# Patient Record
Sex: MALE | Race: WHITE | NOT HISPANIC OR LATINO | Employment: UNEMPLOYED | URBAN - METROPOLITAN AREA
[De-identification: names, ages, dates, MRNs, and addresses within clinical notes are randomized per-mention and may not be internally consistent; named-entity substitution may affect disease eponyms.]

---

## 2017-02-10 ENCOUNTER — COMMUNICATION - HEALTHEAST (OUTPATIENT)
Dept: FAMILY MEDICINE | Facility: CLINIC | Age: 42
End: 2017-02-10

## 2017-02-10 DIAGNOSIS — F41.9 ANXIETY: ICD-10-CM

## 2017-02-14 ENCOUNTER — COMMUNICATION - HEALTHEAST (OUTPATIENT)
Dept: FAMILY MEDICINE | Facility: CLINIC | Age: 42
End: 2017-02-14

## 2017-02-14 DIAGNOSIS — F41.9 ANXIETY: ICD-10-CM

## 2017-03-12 ENCOUNTER — AMBULATORY - HEALTHEAST (OUTPATIENT)
Dept: FAMILY MEDICINE | Facility: CLINIC | Age: 42
End: 2017-03-12

## 2017-03-12 DIAGNOSIS — Z13.21 SCREENING FOR ENDOCRINE, NUTRITIONAL, METABOLIC AND IMMUNITY DISORDER: ICD-10-CM

## 2017-03-12 DIAGNOSIS — Z13.228 SCREENING FOR ENDOCRINE, NUTRITIONAL, METABOLIC AND IMMUNITY DISORDER: ICD-10-CM

## 2017-03-12 DIAGNOSIS — Z13.29 SCREENING FOR ENDOCRINE, NUTRITIONAL, METABOLIC AND IMMUNITY DISORDER: ICD-10-CM

## 2017-03-12 DIAGNOSIS — E55.9 VITAMIN D DEFICIENCY: ICD-10-CM

## 2017-03-12 DIAGNOSIS — Z13.0 SCREENING FOR ENDOCRINE, NUTRITIONAL, METABOLIC AND IMMUNITY DISORDER: ICD-10-CM

## 2017-03-12 DIAGNOSIS — Z00.00 VISIT FOR PREVENTIVE HEALTH EXAMINATION: ICD-10-CM

## 2017-03-14 ENCOUNTER — OFFICE VISIT - HEALTHEAST (OUTPATIENT)
Dept: FAMILY MEDICINE | Facility: CLINIC | Age: 42
End: 2017-03-14

## 2017-03-14 DIAGNOSIS — Z00.00 VISIT FOR PREVENTIVE HEALTH EXAMINATION: ICD-10-CM

## 2017-03-14 DIAGNOSIS — Z13.0 SCREENING FOR ENDOCRINE, NUTRITIONAL, METABOLIC AND IMMUNITY DISORDER: ICD-10-CM

## 2017-03-14 DIAGNOSIS — Z13.228 SCREENING FOR ENDOCRINE, NUTRITIONAL, METABOLIC AND IMMUNITY DISORDER: ICD-10-CM

## 2017-03-14 DIAGNOSIS — Z13.21 SCREENING FOR ENDOCRINE, NUTRITIONAL, METABOLIC AND IMMUNITY DISORDER: ICD-10-CM

## 2017-03-14 DIAGNOSIS — Z13.29 SCREENING FOR ENDOCRINE, NUTRITIONAL, METABOLIC AND IMMUNITY DISORDER: ICD-10-CM

## 2017-03-14 DIAGNOSIS — F41.9 ANXIETY: ICD-10-CM

## 2017-03-14 DIAGNOSIS — E55.9 VITAMIN D DEFICIENCY: ICD-10-CM

## 2017-03-14 LAB
CHOLEST SERPL-MCNC: 161 MG/DL
FASTING STATUS PATIENT QL REPORTED: NORMAL
HDLC SERPL-MCNC: 66 MG/DL
LDLC SERPL CALC-MCNC: 83 MG/DL
TRIGL SERPL-MCNC: 59 MG/DL

## 2017-03-14 ASSESSMENT — MIFFLIN-ST. JEOR: SCORE: 1753.09

## 2017-03-16 ENCOUNTER — COMMUNICATION - HEALTHEAST (OUTPATIENT)
Dept: FAMILY MEDICINE | Facility: CLINIC | Age: 42
End: 2017-03-16

## 2017-03-16 DIAGNOSIS — F41.9 ANXIETY: ICD-10-CM

## 2017-03-18 ENCOUNTER — AMBULATORY - HEALTHEAST (OUTPATIENT)
Dept: FAMILY MEDICINE | Facility: CLINIC | Age: 42
End: 2017-03-18

## 2017-03-18 DIAGNOSIS — E55.9 VITAMIN D DEFICIENCY: ICD-10-CM

## 2017-07-29 ENCOUNTER — COMMUNICATION - HEALTHEAST (OUTPATIENT)
Dept: FAMILY MEDICINE | Facility: CLINIC | Age: 42
End: 2017-07-29

## 2017-07-29 DIAGNOSIS — F41.9 ANXIETY: ICD-10-CM

## 2017-08-24 ENCOUNTER — COMMUNICATION - HEALTHEAST (OUTPATIENT)
Dept: FAMILY MEDICINE | Facility: CLINIC | Age: 42
End: 2017-08-24

## 2017-08-24 DIAGNOSIS — G47.00 INSOMNIA: ICD-10-CM

## 2017-08-26 ENCOUNTER — COMMUNICATION - HEALTHEAST (OUTPATIENT)
Dept: FAMILY MEDICINE | Facility: CLINIC | Age: 42
End: 2017-08-26

## 2017-08-26 DIAGNOSIS — E55.9 VITAMIN D DEFICIENCY: ICD-10-CM

## 2017-11-16 ENCOUNTER — COMMUNICATION - HEALTHEAST (OUTPATIENT)
Dept: FAMILY MEDICINE | Facility: CLINIC | Age: 42
End: 2017-11-16

## 2017-11-16 DIAGNOSIS — G47.00 INSOMNIA: ICD-10-CM

## 2017-12-03 ENCOUNTER — COMMUNICATION - HEALTHEAST (OUTPATIENT)
Dept: FAMILY MEDICINE | Facility: CLINIC | Age: 42
End: 2017-12-03

## 2017-12-03 DIAGNOSIS — F32.A DEPRESSION: ICD-10-CM

## 2018-02-13 ENCOUNTER — COMMUNICATION - HEALTHEAST (OUTPATIENT)
Dept: FAMILY MEDICINE | Facility: CLINIC | Age: 43
End: 2018-02-13

## 2018-02-13 DIAGNOSIS — E55.9 VITAMIN D DEFICIENCY: ICD-10-CM

## 2018-03-20 ENCOUNTER — COMMUNICATION - HEALTHEAST (OUTPATIENT)
Dept: FAMILY MEDICINE | Facility: CLINIC | Age: 43
End: 2018-03-20

## 2018-03-20 DIAGNOSIS — F32.A DEPRESSION: ICD-10-CM

## 2018-06-17 ENCOUNTER — COMMUNICATION - HEALTHEAST (OUTPATIENT)
Dept: FAMILY MEDICINE | Facility: CLINIC | Age: 43
End: 2018-06-17

## 2018-06-17 DIAGNOSIS — F32.A DEPRESSION: ICD-10-CM

## 2018-07-05 ENCOUNTER — OFFICE VISIT - HEALTHEAST (OUTPATIENT)
Dept: FAMILY MEDICINE | Facility: CLINIC | Age: 43
End: 2018-07-05

## 2018-07-05 DIAGNOSIS — Z13.21 SCREENING FOR ENDOCRINE, NUTRITIONAL, METABOLIC AND IMMUNITY DISORDER: ICD-10-CM

## 2018-07-05 DIAGNOSIS — Z13.29 SCREENING FOR ENDOCRINE, NUTRITIONAL, METABOLIC AND IMMUNITY DISORDER: ICD-10-CM

## 2018-07-05 DIAGNOSIS — Z13.228 SCREENING FOR ENDOCRINE, NUTRITIONAL, METABOLIC AND IMMUNITY DISORDER: ICD-10-CM

## 2018-07-05 DIAGNOSIS — Z00.00 VISIT FOR PREVENTIVE HEALTH EXAMINATION: ICD-10-CM

## 2018-07-05 DIAGNOSIS — Z13.0 SCREENING FOR ENDOCRINE, NUTRITIONAL, METABOLIC AND IMMUNITY DISORDER: ICD-10-CM

## 2018-07-05 DIAGNOSIS — E55.9 VITAMIN D DEFICIENCY: ICD-10-CM

## 2018-07-05 LAB
ALBUMIN SERPL-MCNC: 4 G/DL (ref 3.5–5)
ALP SERPL-CCNC: 142 U/L (ref 45–120)
ALT SERPL W P-5'-P-CCNC: 48 U/L (ref 0–45)
ANION GAP SERPL CALCULATED.3IONS-SCNC: 8 MMOL/L (ref 5–18)
AST SERPL W P-5'-P-CCNC: 39 U/L (ref 0–40)
BILIRUB SERPL-MCNC: 0.5 MG/DL (ref 0–1)
BUN SERPL-MCNC: 21 MG/DL (ref 8–22)
CALCIUM SERPL-MCNC: 9.7 MG/DL (ref 8.5–10.5)
CHLORIDE BLD-SCNC: 102 MMOL/L (ref 98–107)
CHOLEST SERPL-MCNC: 156 MG/DL
CO2 SERPL-SCNC: 30 MMOL/L (ref 22–31)
CREAT SERPL-MCNC: 1.12 MG/DL (ref 0.7–1.3)
FASTING STATUS PATIENT QL REPORTED: YES
GFR SERPL CREATININE-BSD FRML MDRD: >60 ML/MIN/1.73M2
GLUCOSE BLD-MCNC: 94 MG/DL (ref 70–125)
HDLC SERPL-MCNC: 64 MG/DL
LDLC SERPL CALC-MCNC: 80 MG/DL
POTASSIUM BLD-SCNC: 4.9 MMOL/L (ref 3.5–5)
PROT SERPL-MCNC: 6.5 G/DL (ref 6–8)
SODIUM SERPL-SCNC: 140 MMOL/L (ref 136–145)
TRIGL SERPL-MCNC: 60 MG/DL
TSH SERPL DL<=0.005 MIU/L-ACNC: 3.1 UIU/ML (ref 0.3–5)

## 2018-07-05 ASSESSMENT — MIFFLIN-ST. JEOR: SCORE: 1759.78

## 2018-07-06 LAB — 25(OH)D3 SERPL-MCNC: 91.8 NG/ML (ref 30–80)

## 2018-07-09 ENCOUNTER — COMMUNICATION - HEALTHEAST (OUTPATIENT)
Dept: FAMILY MEDICINE | Facility: CLINIC | Age: 43
End: 2018-07-09

## 2018-11-26 ENCOUNTER — COMMUNICATION - HEALTHEAST (OUTPATIENT)
Dept: FAMILY MEDICINE | Facility: CLINIC | Age: 43
End: 2018-11-26

## 2018-11-26 DIAGNOSIS — G47.00 INSOMNIA: ICD-10-CM

## 2018-12-12 ENCOUNTER — OFFICE VISIT - HEALTHEAST (OUTPATIENT)
Dept: FAMILY MEDICINE | Facility: CLINIC | Age: 43
End: 2018-12-12

## 2018-12-12 DIAGNOSIS — R25.3 MUSCLE TWITCH: ICD-10-CM

## 2018-12-12 ASSESSMENT — MIFFLIN-ST. JEOR: SCORE: 1733.7

## 2019-06-26 ENCOUNTER — OFFICE VISIT - HEALTHEAST (OUTPATIENT)
Dept: FAMILY MEDICINE | Facility: CLINIC | Age: 44
End: 2019-06-26

## 2019-06-26 DIAGNOSIS — R35.0 URINARY FREQUENCY: ICD-10-CM

## 2019-06-26 LAB
ALBUMIN SERPL-MCNC: 3.7 G/DL (ref 3.5–5)
ALBUMIN UR-MCNC: NEGATIVE MG/DL
ALP SERPL-CCNC: 133 U/L (ref 45–120)
ALT SERPL W P-5'-P-CCNC: 23 U/L (ref 0–45)
ANION GAP SERPL CALCULATED.3IONS-SCNC: 8 MMOL/L (ref 5–18)
APPEARANCE UR: CLEAR
AST SERPL W P-5'-P-CCNC: 27 U/L (ref 0–40)
BACTERIA #/AREA URNS HPF: ABNORMAL HPF
BILIRUB SERPL-MCNC: 0.3 MG/DL (ref 0–1)
BILIRUB UR QL STRIP: NEGATIVE
BUN SERPL-MCNC: 25 MG/DL (ref 8–22)
CALCIUM SERPL-MCNC: 9.5 MG/DL (ref 8.5–10.5)
CHLORIDE BLD-SCNC: 106 MMOL/L (ref 98–107)
CO2 SERPL-SCNC: 27 MMOL/L (ref 22–31)
COLOR UR AUTO: YELLOW
CREAT SERPL-MCNC: 1.02 MG/DL (ref 0.7–1.3)
GFR SERPL CREATININE-BSD FRML MDRD: >60 ML/MIN/1.73M2
GLUCOSE BLD-MCNC: 72 MG/DL (ref 70–125)
GLUCOSE UR STRIP-MCNC: NEGATIVE MG/DL
HGB UR QL STRIP: NEGATIVE
KETONES UR STRIP-MCNC: NEGATIVE MG/DL
LEUKOCYTE ESTERASE UR QL STRIP: ABNORMAL
NITRATE UR QL: NEGATIVE
OSMOLALITY SERPL: 300 MOSM/KG (ref 270–300)
OSMOLALITY UR: 653 MOSM/KG (ref 300–900)
PH UR STRIP: 5 [PH] (ref 5–8)
POTASSIUM BLD-SCNC: 4.3 MMOL/L (ref 3.5–5)
PROT SERPL-MCNC: 6 G/DL (ref 6–8)
PSA SERPL-MCNC: 1.8 NG/ML (ref 0–2.5)
RBC #/AREA URNS AUTO: ABNORMAL HPF
SODIUM SERPL-SCNC: 141 MMOL/L (ref 136–145)
SODIUM UR-SCNC: 113 MMOL/L
SP GR UR STRIP: 1.02 (ref 1–1.03)
SQUAMOUS #/AREA URNS AUTO: ABNORMAL LPF
TSH SERPL DL<=0.005 MIU/L-ACNC: 2.06 UIU/ML (ref 0.3–5)
UROBILINOGEN UR STRIP-ACNC: ABNORMAL
WBC #/AREA URNS AUTO: ABNORMAL HPF

## 2019-06-26 ASSESSMENT — MIFFLIN-ST. JEOR: SCORE: 1839.16

## 2019-06-27 LAB — BACTERIA SPEC CULT: NO GROWTH

## 2019-09-18 ENCOUNTER — OFFICE VISIT - HEALTHEAST (OUTPATIENT)
Dept: FAMILY MEDICINE | Facility: CLINIC | Age: 44
End: 2019-09-18

## 2019-09-18 DIAGNOSIS — Z13.228 SCREENING FOR ENDOCRINE, NUTRITIONAL, METABOLIC AND IMMUNITY DISORDER: ICD-10-CM

## 2019-09-18 DIAGNOSIS — Z13.29 SCREENING FOR ENDOCRINE, NUTRITIONAL, METABOLIC AND IMMUNITY DISORDER: ICD-10-CM

## 2019-09-18 DIAGNOSIS — Z13.0 SCREENING FOR ENDOCRINE, NUTRITIONAL, METABOLIC AND IMMUNITY DISORDER: ICD-10-CM

## 2019-09-18 DIAGNOSIS — Z00.00 VISIT FOR PREVENTIVE HEALTH EXAMINATION: ICD-10-CM

## 2019-09-18 DIAGNOSIS — Z13.21 SCREENING FOR ENDOCRINE, NUTRITIONAL, METABOLIC AND IMMUNITY DISORDER: ICD-10-CM

## 2019-09-18 DIAGNOSIS — F41.1 ANXIETY STATE: ICD-10-CM

## 2019-09-18 DIAGNOSIS — Z11.4 SCREENING FOR HIV WITHOUT PRESENCE OF RISK FACTORS: ICD-10-CM

## 2019-09-18 DIAGNOSIS — R35.0 INCREASED FREQUENCY OF URINATION: ICD-10-CM

## 2019-09-18 LAB
ALBUMIN SERPL-MCNC: 3.8 G/DL (ref 3.5–5)
ALP SERPL-CCNC: 123 U/L (ref 45–120)
ALT SERPL W P-5'-P-CCNC: 26 U/L (ref 0–45)
ANION GAP SERPL CALCULATED.3IONS-SCNC: 9 MMOL/L (ref 5–18)
AST SERPL W P-5'-P-CCNC: 34 U/L (ref 0–40)
BILIRUB SERPL-MCNC: 0.8 MG/DL (ref 0–1)
BUN SERPL-MCNC: 21 MG/DL (ref 8–22)
CALCIUM SERPL-MCNC: 9.5 MG/DL (ref 8.5–10.5)
CHLORIDE BLD-SCNC: 105 MMOL/L (ref 98–107)
CHOLEST SERPL-MCNC: 175 MG/DL
CO2 SERPL-SCNC: 25 MMOL/L (ref 22–31)
CREAT SERPL-MCNC: 1.1 MG/DL (ref 0.7–1.3)
FASTING STATUS PATIENT QL REPORTED: YES
GFR SERPL CREATININE-BSD FRML MDRD: >60 ML/MIN/1.73M2
GLUCOSE BLD-MCNC: 89 MG/DL (ref 70–125)
HDLC SERPL-MCNC: 68 MG/DL
HIV 1+2 AB+HIV1 P24 AG SERPL QL IA: NEGATIVE
LDLC SERPL CALC-MCNC: 98 MG/DL
POTASSIUM BLD-SCNC: 4.3 MMOL/L (ref 3.5–5)
PROT SERPL-MCNC: 6.4 G/DL (ref 6–8)
SODIUM SERPL-SCNC: 139 MMOL/L (ref 136–145)
TRIGL SERPL-MCNC: 43 MG/DL

## 2019-09-18 RX ORDER — CHLORAL HYDRATE 500 MG
2 CAPSULE ORAL DAILY
Status: SHIPPED | COMMUNITY
Start: 2019-09-18

## 2019-09-18 ASSESSMENT — MIFFLIN-ST. JEOR: SCORE: 1837.46

## 2019-10-02 ENCOUNTER — RECORDS - HEALTHEAST (OUTPATIENT)
Dept: ADMINISTRATIVE | Facility: OTHER | Age: 44
End: 2019-10-02

## 2019-10-09 ENCOUNTER — COMMUNICATION - HEALTHEAST (OUTPATIENT)
Dept: FAMILY MEDICINE | Facility: CLINIC | Age: 44
End: 2019-10-09

## 2019-10-09 DIAGNOSIS — E55.9 VITAMIN D DEFICIENCY: ICD-10-CM

## 2019-10-09 RX ORDER — ERGOCALCIFEROL 1.25 MG/1
50000 CAPSULE ORAL WEEKLY
Qty: 8 CAPSULE | Refills: 0 | Status: SHIPPED | OUTPATIENT
Start: 2019-10-09

## 2019-12-04 ENCOUNTER — RECORDS - HEALTHEAST (OUTPATIENT)
Dept: ADMINISTRATIVE | Facility: OTHER | Age: 44
End: 2019-12-04

## 2020-09-02 ENCOUNTER — OFFICE VISIT - HEALTHEAST (OUTPATIENT)
Dept: FAMILY MEDICINE | Facility: CLINIC | Age: 45
End: 2020-09-02

## 2020-09-02 DIAGNOSIS — R22.31 MASS OF ARM, RIGHT: ICD-10-CM

## 2020-09-02 DIAGNOSIS — F43.10 PTSD (POST-TRAUMATIC STRESS DISORDER): ICD-10-CM

## 2020-09-02 DIAGNOSIS — G43.109 MIGRAINE WITH AURA AND WITHOUT STATUS MIGRAINOSUS, NOT INTRACTABLE: ICD-10-CM

## 2020-09-02 DIAGNOSIS — F41.1 ANXIETY STATE: ICD-10-CM

## 2020-09-02 RX ORDER — SUMATRIPTAN 50 MG/1
50 TABLET, FILM COATED ORAL
Qty: 30 TABLET | Refills: 2 | Status: SHIPPED | OUTPATIENT
Start: 2020-09-02 | End: 2021-07-21

## 2020-09-09 ENCOUNTER — RECORDS - HEALTHEAST (OUTPATIENT)
Dept: ADMINISTRATIVE | Facility: OTHER | Age: 45
End: 2020-09-09

## 2020-09-10 ENCOUNTER — COMMUNICATION - HEALTHEAST (OUTPATIENT)
Dept: FAMILY MEDICINE | Facility: CLINIC | Age: 45
End: 2020-09-10

## 2020-09-10 ENCOUNTER — HOSPITAL ENCOUNTER (OUTPATIENT)
Dept: ULTRASOUND IMAGING | Facility: CLINIC | Age: 45
Discharge: HOME | End: 2020-09-10
Attending: FAMILY MEDICINE

## 2020-09-10 DIAGNOSIS — R22.31 MASS OF ARM, RIGHT: ICD-10-CM

## 2020-09-18 ENCOUNTER — COMMUNICATION - HEALTHEAST (OUTPATIENT)
Dept: FAMILY MEDICINE | Facility: CLINIC | Age: 45
End: 2020-09-18

## 2020-09-18 DIAGNOSIS — M62.89 PELVIC FLOOR DYSFUNCTION: ICD-10-CM

## 2020-09-18 DIAGNOSIS — N50.82 SCROTUM PAIN: ICD-10-CM

## 2020-09-19 ENCOUNTER — COMMUNICATION - HEALTHEAST (OUTPATIENT)
Dept: FAMILY MEDICINE | Facility: CLINIC | Age: 45
End: 2020-09-19

## 2020-09-19 DIAGNOSIS — F43.10 PTSD (POST-TRAUMATIC STRESS DISORDER): ICD-10-CM

## 2020-09-25 ENCOUNTER — HOSPITAL ENCOUNTER (OUTPATIENT)
Dept: ULTRASOUND IMAGING | Facility: CLINIC | Age: 45
Discharge: HOME | End: 2020-09-25
Attending: FAMILY MEDICINE

## 2020-09-25 ENCOUNTER — AMBULATORY - HEALTHEAST (OUTPATIENT)
Dept: FAMILY MEDICINE | Facility: CLINIC | Age: 45
End: 2020-09-25

## 2020-09-25 ENCOUNTER — COMMUNICATION - HEALTHEAST (OUTPATIENT)
Dept: FAMILY MEDICINE | Facility: CLINIC | Age: 45
End: 2020-09-25

## 2020-09-25 DIAGNOSIS — R35.0 FREQUENT URINATION: ICD-10-CM

## 2020-09-25 DIAGNOSIS — N50.82 SCROTUM PAIN: ICD-10-CM

## 2020-09-25 DIAGNOSIS — M62.89 PELVIC FLOOR DYSFUNCTION: ICD-10-CM

## 2020-10-10 ENCOUNTER — COMMUNICATION - HEALTHEAST (OUTPATIENT)
Dept: FAMILY MEDICINE | Facility: CLINIC | Age: 45
End: 2020-10-10

## 2020-10-10 DIAGNOSIS — F43.10 PTSD (POST-TRAUMATIC STRESS DISORDER): ICD-10-CM

## 2020-10-10 RX ORDER — PRAZOSIN HYDROCHLORIDE 2 MG/1
4 CAPSULE ORAL AT BEDTIME
Qty: 180 CAPSULE | Refills: 3 | Status: SHIPPED | OUTPATIENT
Start: 2020-10-10 | End: 2021-07-21

## 2020-10-12 ENCOUNTER — THERAPY VISIT (OUTPATIENT)
Dept: PHYSICAL THERAPY | Facility: CLINIC | Age: 45
End: 2020-10-12
Payer: COMMERCIAL

## 2020-10-12 DIAGNOSIS — R27.8 MUSCULAR INCOORDINATION: ICD-10-CM

## 2020-10-12 DIAGNOSIS — M62.89 PELVIC FLOOR DYSFUNCTION: ICD-10-CM

## 2020-10-12 DIAGNOSIS — R10.2 PELVIC PAIN IN MALE: ICD-10-CM

## 2020-10-12 DIAGNOSIS — R35.0 URINARY FREQUENCY: ICD-10-CM

## 2020-10-12 PROCEDURE — 97535 SELF CARE MNGMENT TRAINING: CPT | Mod: GP | Performed by: PHYSICAL THERAPIST

## 2020-10-12 PROCEDURE — 97110 THERAPEUTIC EXERCISES: CPT | Mod: GP | Performed by: PHYSICAL THERAPIST

## 2020-10-12 PROCEDURE — 97162 PT EVAL MOD COMPLEX 30 MIN: CPT | Mod: GP | Performed by: PHYSICAL THERAPIST
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
## 2020-10-15 ENCOUNTER — THERAPY VISIT (OUTPATIENT)
Dept: PHYSICAL THERAPY | Facility: CLINIC | Age: 45
End: 2020-10-15
Payer: COMMERCIAL

## 2020-10-15 DIAGNOSIS — R35.0 URINARY FREQUENCY: ICD-10-CM

## 2020-10-15 DIAGNOSIS — M62.89 HIGH-TONE PELVIC FLOOR DYSFUNCTION: ICD-10-CM

## 2020-10-15 DIAGNOSIS — R27.8 MUSCULAR INCOORDINATION: ICD-10-CM

## 2020-10-15 DIAGNOSIS — R10.2 PELVIC PAIN IN MALE: ICD-10-CM

## 2020-10-15 PROCEDURE — 97110 THERAPEUTIC EXERCISES: CPT | Mod: GP | Performed by: PHYSICAL THERAPIST

## 2020-10-15 PROCEDURE — 97112 NEUROMUSCULAR REEDUCATION: CPT | Mod: GP | Performed by: PHYSICAL THERAPIST

## 2020-10-15 PROCEDURE — 97530 THERAPEUTIC ACTIVITIES: CPT | Mod: GP | Performed by: PHYSICAL THERAPIST

## 2020-10-26 ENCOUNTER — THERAPY VISIT (OUTPATIENT)
Dept: PHYSICAL THERAPY | Facility: CLINIC | Age: 45
End: 2020-10-26
Payer: COMMERCIAL

## 2020-10-26 ENCOUNTER — OFFICE VISIT - HEALTHEAST (OUTPATIENT)
Dept: FAMILY MEDICINE | Facility: CLINIC | Age: 45
End: 2020-10-26

## 2020-10-26 DIAGNOSIS — Z13.29 SCREENING FOR ENDOCRINE, NUTRITIONAL, METABOLIC AND IMMUNITY DISORDER: ICD-10-CM

## 2020-10-26 DIAGNOSIS — G43.109 MIGRAINE WITH AURA AND WITHOUT STATUS MIGRAINOSUS, NOT INTRACTABLE: ICD-10-CM

## 2020-10-26 DIAGNOSIS — M62.89 HIGH-TONE PELVIC FLOOR DYSFUNCTION: ICD-10-CM

## 2020-10-26 DIAGNOSIS — Z13.21 SCREENING FOR ENDOCRINE, NUTRITIONAL, METABOLIC AND IMMUNITY DISORDER: ICD-10-CM

## 2020-10-26 DIAGNOSIS — Z13.228 SCREENING FOR ENDOCRINE, NUTRITIONAL, METABOLIC AND IMMUNITY DISORDER: ICD-10-CM

## 2020-10-26 DIAGNOSIS — Z13.0 SCREENING FOR ENDOCRINE, NUTRITIONAL, METABOLIC AND IMMUNITY DISORDER: ICD-10-CM

## 2020-10-26 DIAGNOSIS — R35.0 URINARY FREQUENCY: ICD-10-CM

## 2020-10-26 DIAGNOSIS — R22.31 SKIN LUMP OF ARM, RIGHT: ICD-10-CM

## 2020-10-26 DIAGNOSIS — R10.2 PELVIC PAIN IN MALE: ICD-10-CM

## 2020-10-26 DIAGNOSIS — Z00.00 VISIT FOR PREVENTIVE HEALTH EXAMINATION: ICD-10-CM

## 2020-10-26 DIAGNOSIS — R27.8 MUSCULAR INCOORDINATION: ICD-10-CM

## 2020-10-26 LAB
ALBUMIN SERPL-MCNC: 3.9 G/DL (ref 3.5–5)
ALP SERPL-CCNC: 129 U/L (ref 45–120)
ALT SERPL W P-5'-P-CCNC: 55 U/L (ref 0–45)
ANION GAP SERPL CALCULATED.3IONS-SCNC: 7 MMOL/L (ref 5–18)
AST SERPL W P-5'-P-CCNC: 50 U/L (ref 0–40)
BILIRUB SERPL-MCNC: 0.7 MG/DL (ref 0–1)
BUN SERPL-MCNC: 25 MG/DL (ref 8–22)
CALCIUM SERPL-MCNC: 9.2 MG/DL (ref 8.5–10.5)
CHLORIDE BLD-SCNC: 103 MMOL/L (ref 98–107)
CHOLEST SERPL-MCNC: 171 MG/DL
CO2 SERPL-SCNC: 30 MMOL/L (ref 22–31)
CREAT SERPL-MCNC: 1.03 MG/DL (ref 0.7–1.3)
ERYTHROCYTE [DISTWIDTH] IN BLOOD BY AUTOMATED COUNT: 11.2 % (ref 11–14.5)
FASTING STATUS PATIENT QL REPORTED: YES
GFR SERPL CREATININE-BSD FRML MDRD: >60 ML/MIN/1.73M2
GLUCOSE BLD-MCNC: 85 MG/DL (ref 70–125)
HCT VFR BLD AUTO: 46.8 % (ref 40–54)
HDLC SERPL-MCNC: 78 MG/DL
HGB BLD-MCNC: 15.1 G/DL (ref 14–18)
LDLC SERPL CALC-MCNC: 84 MG/DL
MCH RBC QN AUTO: 30.6 PG (ref 27–34)
MCHC RBC AUTO-ENTMCNC: 32.4 G/DL (ref 32–36)
MCV RBC AUTO: 94 FL (ref 80–100)
PLATELET # BLD AUTO: 209 THOU/UL (ref 140–440)
PMV BLD AUTO: 6.7 FL (ref 7–10)
POTASSIUM BLD-SCNC: 4.5 MMOL/L (ref 3.5–5)
PROT SERPL-MCNC: 6.3 G/DL (ref 6–8)
RBC # BLD AUTO: 4.95 MILL/UL (ref 4.4–6.2)
SODIUM SERPL-SCNC: 140 MMOL/L (ref 136–145)
TRIGL SERPL-MCNC: 46 MG/DL
TSH SERPL DL<=0.005 MIU/L-ACNC: 2.18 UIU/ML (ref 0.3–5)
WBC: 6 THOU/UL (ref 4–11)

## 2020-10-26 PROCEDURE — 97140 MANUAL THERAPY 1/> REGIONS: CPT | Mod: GP | Performed by: PHYSICAL THERAPIST

## 2020-10-26 PROCEDURE — 97112 NEUROMUSCULAR REEDUCATION: CPT | Mod: GP | Performed by: PHYSICAL THERAPIST

## 2020-10-26 RX ORDER — RIZATRIPTAN BENZOATE 10 MG/1
10 TABLET ORAL PRN
Qty: 30 TABLET | Refills: 6 | Status: SHIPPED | OUTPATIENT
Start: 2020-10-26

## 2020-10-26 RX ORDER — ALPRAZOLAM 2 MG
TABLET ORAL
Status: SHIPPED | COMMUNITY
Start: 2020-09-28 | End: 2022-06-01

## 2020-10-26 RX ORDER — ARIPIPRAZOLE 10 MG/1
TABLET ORAL
Status: SHIPPED | COMMUNITY
Start: 2020-10-23 | End: 2021-07-21

## 2020-10-26 ASSESSMENT — MIFFLIN-ST. JEOR: SCORE: 1863.54

## 2020-10-29 ENCOUNTER — THERAPY VISIT (OUTPATIENT)
Dept: PHYSICAL THERAPY | Facility: CLINIC | Age: 45
End: 2020-10-29
Payer: COMMERCIAL

## 2020-10-29 DIAGNOSIS — M62.89 HIGH-TONE PELVIC FLOOR DYSFUNCTION: ICD-10-CM

## 2020-10-29 DIAGNOSIS — R27.8 MUSCULAR INCOORDINATION: ICD-10-CM

## 2020-10-29 DIAGNOSIS — R35.0 URINARY FREQUENCY: ICD-10-CM

## 2020-10-29 DIAGNOSIS — R10.2 PELVIC PAIN IN MALE: ICD-10-CM

## 2020-10-29 PROCEDURE — 97110 THERAPEUTIC EXERCISES: CPT | Mod: GP | Performed by: PHYSICAL THERAPIST

## 2020-10-29 PROCEDURE — 97112 NEUROMUSCULAR REEDUCATION: CPT | Mod: GP | Performed by: PHYSICAL THERAPIST

## 2020-10-29 PROCEDURE — 97140 MANUAL THERAPY 1/> REGIONS: CPT | Mod: GP | Performed by: PHYSICAL THERAPIST

## 2020-10-30 ENCOUNTER — COMMUNICATION - HEALTHEAST (OUTPATIENT)
Dept: FAMILY MEDICINE | Facility: CLINIC | Age: 45
End: 2020-10-30

## 2020-10-30 DIAGNOSIS — R74.01 NONSPECIFIC ELEVATION OF LEVELS OF TRANSAMINASE OR LACTIC ACID DEHYDROGENASE (LDH): ICD-10-CM

## 2020-10-30 DIAGNOSIS — R74.02 NONSPECIFIC ELEVATION OF LEVELS OF TRANSAMINASE OR LACTIC ACID DEHYDROGENASE (LDH): ICD-10-CM

## 2020-11-02 ENCOUNTER — THERAPY VISIT (OUTPATIENT)
Dept: PHYSICAL THERAPY | Facility: CLINIC | Age: 45
End: 2020-11-02
Payer: COMMERCIAL

## 2020-11-02 DIAGNOSIS — R27.8 MUSCULAR INCOORDINATION: ICD-10-CM

## 2020-11-02 DIAGNOSIS — R35.0 URINARY FREQUENCY: ICD-10-CM

## 2020-11-02 DIAGNOSIS — M62.89 HIGH-TONE PELVIC FLOOR DYSFUNCTION: Primary | ICD-10-CM

## 2020-11-02 DIAGNOSIS — R10.2 PELVIC PAIN IN MALE: ICD-10-CM

## 2020-11-02 PROCEDURE — 97112 NEUROMUSCULAR REEDUCATION: CPT | Mod: GP | Performed by: PHYSICAL THERAPIST

## 2020-11-02 PROCEDURE — 97110 THERAPEUTIC EXERCISES: CPT | Mod: GP | Performed by: PHYSICAL THERAPIST

## 2020-11-05 ENCOUNTER — THERAPY VISIT (OUTPATIENT)
Dept: PHYSICAL THERAPY | Facility: CLINIC | Age: 45
End: 2020-11-05
Payer: COMMERCIAL

## 2020-11-05 DIAGNOSIS — M62.89 HIGH-TONE PELVIC FLOOR DYSFUNCTION: Primary | ICD-10-CM

## 2020-11-05 DIAGNOSIS — R35.0 URINARY FREQUENCY: ICD-10-CM

## 2020-11-05 DIAGNOSIS — R10.2 PELVIC PAIN IN MALE: ICD-10-CM

## 2020-11-05 DIAGNOSIS — R27.8 MUSCULAR INCOORDINATION: ICD-10-CM

## 2020-11-05 PROCEDURE — 97112 NEUROMUSCULAR REEDUCATION: CPT | Mod: GP | Performed by: PHYSICAL THERAPIST

## 2020-11-05 PROCEDURE — 97110 THERAPEUTIC EXERCISES: CPT | Mod: GP | Performed by: PHYSICAL THERAPIST

## 2020-11-09 ENCOUNTER — THERAPY VISIT (OUTPATIENT)
Dept: PHYSICAL THERAPY | Facility: CLINIC | Age: 45
End: 2020-11-09
Payer: COMMERCIAL

## 2020-11-09 DIAGNOSIS — R27.8 MUSCULAR INCOORDINATION: ICD-10-CM

## 2020-11-09 DIAGNOSIS — R10.2 PELVIC PAIN IN MALE: ICD-10-CM

## 2020-11-09 DIAGNOSIS — M62.89 HIGH-TONE PELVIC FLOOR DYSFUNCTION: Primary | ICD-10-CM

## 2020-11-09 DIAGNOSIS — R35.0 URINARY FREQUENCY: ICD-10-CM

## 2020-11-09 PROCEDURE — 97110 THERAPEUTIC EXERCISES: CPT | Mod: GP | Performed by: PHYSICAL THERAPIST

## 2020-11-09 PROCEDURE — 97112 NEUROMUSCULAR REEDUCATION: CPT | Mod: GP | Performed by: PHYSICAL THERAPIST

## 2020-11-16 ENCOUNTER — THERAPY VISIT (OUTPATIENT)
Dept: PHYSICAL THERAPY | Facility: CLINIC | Age: 45
End: 2020-11-16
Payer: COMMERCIAL

## 2020-11-16 DIAGNOSIS — R10.2 PELVIC PAIN IN MALE: ICD-10-CM

## 2020-11-16 DIAGNOSIS — R27.8 MUSCULAR INCOORDINATION: ICD-10-CM

## 2020-11-16 DIAGNOSIS — R35.0 URINARY FREQUENCY: ICD-10-CM

## 2020-11-16 DIAGNOSIS — M62.89 HIGH-TONE PELVIC FLOOR DYSFUNCTION: Primary | ICD-10-CM

## 2020-11-16 PROCEDURE — 97110 THERAPEUTIC EXERCISES: CPT | Mod: GP | Performed by: PHYSICAL THERAPIST

## 2020-11-16 PROCEDURE — 97112 NEUROMUSCULAR REEDUCATION: CPT | Mod: GP | Performed by: PHYSICAL THERAPIST

## 2020-11-16 PROCEDURE — 97535 SELF CARE MNGMENT TRAINING: CPT | Mod: GP | Performed by: PHYSICAL THERAPIST

## 2020-11-23 ENCOUNTER — THERAPY VISIT (OUTPATIENT)
Dept: PHYSICAL THERAPY | Facility: CLINIC | Age: 45
End: 2020-11-23
Payer: COMMERCIAL

## 2020-11-23 DIAGNOSIS — R27.8 MUSCULAR INCOORDINATION: ICD-10-CM

## 2020-11-23 DIAGNOSIS — M62.89 HIGH-TONE PELVIC FLOOR DYSFUNCTION: ICD-10-CM

## 2020-11-23 DIAGNOSIS — R35.0 URINARY FREQUENCY: ICD-10-CM

## 2020-11-23 DIAGNOSIS — R10.2 PELVIC PAIN IN MALE: ICD-10-CM

## 2020-11-23 PROCEDURE — 97112 NEUROMUSCULAR REEDUCATION: CPT | Mod: GP | Performed by: PHYSICAL THERAPIST

## 2021-05-26 ENCOUNTER — COMMUNICATION - HEALTHEAST (OUTPATIENT)
Dept: FAMILY MEDICINE | Facility: CLINIC | Age: 46
End: 2021-05-26

## 2021-05-26 DIAGNOSIS — G47.00 INSOMNIA: ICD-10-CM

## 2021-05-27 RX ORDER — TRAZODONE HYDROCHLORIDE 150 MG/1
TABLET ORAL
Qty: 180 TABLET | Refills: 1 | Status: SHIPPED | OUTPATIENT
Start: 2021-05-27 | End: 2022-06-08

## 2021-05-30 VITALS — BODY MASS INDEX: 21.14 KG/M2 | HEIGHT: 75 IN | WEIGHT: 170 LBS

## 2021-05-30 NOTE — PROGRESS NOTES
"OFFICE VISIT - FAMILY MEDICINE     ASSESSMENT AND PLAN     1. Urinary frequency  Urinalysis-UC if Indicated    Culture, Urine    Osmolality    Osmolality, Random, Urine    Comprehensive Metabolic Panel    Sodium, Random Urine    Thyroid Stimulating Hormone (TSH)    PSA (Prostatic-Specific Antigen), Annual Screen   Urinary frequency, differential diagnosis discussed, included infection, inflammation, hormonal imbalance, prostate symptoms etc.,  Recommendation to avoid irritant substances like caffeine, tea etc. control his water intake, he will be returning for a physical next month, will do a  prostate exam at that time.  He could also try over-the-counter medication like saw palmetto etc.  CHIEF COMPLAINT   Urinary Frequency (has had for awhile, no annoying at work (, lifting items). No pain or burning sensation. Had 1 cup of coffee, urinated 5 times (full stream).)    HPI   Anson Cifuentes is a 43 y.o. male.  Past history includes anxiety, bipolar disorder, insomnia felt to be physiologic, and erectile dysfunction.  Managed in the past by [facility].    Has been noticing increased urination the past few months, he works as a , and several times in the recent past, he has to stop working order to go to the bathroom.  Denies any dysuria, change in urine color or odor; has also noticing that drinking coffee makes him go even more than usual.  Stating that does not drink a lot of water.  Has also been waking up at least once to twice at night to go to the bathroom.      Review of Systems As per HPI, otherwise negative.    OBJECTIVE   /70 (Patient Site: Left Arm, Patient Position: Sitting, Cuff Size: Adult Regular)   Pulse 60   Ht 6' 4.25\" (1.937 m)   Wt 186 lb (84.4 kg)   SpO2 98%   BMI 22.49 kg/m    Physical Exam   Constitutional: He is oriented to person, place, and time. He appears well-developed and well-nourished.   HENT:   Head: Normocephalic and atraumatic.   Neck: " Normal range of motion. Neck supple. No JVD present. No tracheal deviation present. No thyromegaly present.   Cardiovascular: Normal rate, regular rhythm, normal heart sounds and intact distal pulses. Exam reveals no gallop and no friction rub.   No murmur heard.  Pulmonary/Chest: Effort normal and breath sounds normal. No respiratory distress. He has no wheezes. He has no rales.   Musculoskeletal: He exhibits no edema or tenderness.   Lymphadenopathy:     He has no cervical adenopathy.   Neurological: He is alert and oriented to person, place, and time. Coordination normal.   Psychiatric: He has a normal mood and affect. Judgment and thought content normal.       PFSH     Family History   Problem Relation Age of Onset     Cancer Mother      Prostate cancer Father      Diabetes Unknown      Alcohol abuse Other      Breast cancer Sister      Arthritis Maternal Grandmother      Alcohol abuse Maternal Grandfather      Depression Maternal Grandfather      Social History     Socioeconomic History     Marital status:      Spouse name: Not on file     Number of children: Not on file     Years of education: Not on file     Highest education level: Not on file   Occupational History     Occupation: Professional    Social Needs     Financial resource strain: Not on file     Food insecurity:     Worry: Not on file     Inability: Not on file     Transportation needs:     Medical: Not on file     Non-medical: Not on file   Tobacco Use     Smoking status: Never Smoker     Smokeless tobacco: Never Used   Substance and Sexual Activity     Alcohol use: Yes     Alcohol/week: 1.2 oz     Types: 2 Cans of beer per week     Drug use: Not on file     Sexual activity: Yes     Partners: Female     Comment:    Lifestyle     Physical activity:     Days per week: Not on file     Minutes per session: Not on file     Stress: Not on file   Relationships     Social connections:     Talks on phone: Not on file     Gets together:  Not on file     Attends Zoroastrian service: Not on file     Active member of club or organization: Not on file     Attends meetings of clubs or organizations: Not on file     Relationship status: Not on file     Intimate partner violence:     Fear of current or ex partner: Not on file     Emotionally abused: Not on file     Physically abused: Not on file     Forced sexual activity: Not on file   Other Topics Concern     Not on file   Social History Narrative     Not on file     Relevant history was reviewed with the patient today, unless noted in HPI, nothing is pertinent for this visit.  [facility]     Patient Active Problem List    Diagnosis Date Noted     Visit for preventive health examination 03/12/2017     Seasonal allergies 06/29/2015     Vitamin D deficiency 04/29/2015     Atypical Chest Pain      Anxiety      Overview Note:     Replacement Utility updated for latest IMO load       Esophageal reflux      Insomnia      Male Erectile Disorder      No past surgical history on file.    RESULTS/CONSULTS (Lab/Rad)     Recent Results (from the past 168 hour(s))   Urinalysis-UC if Indicated   Result Value Ref Range    Color, UA Yellow Colorless, Yellow, Straw, Light Yellow    Clarity, UA Clear Clear    Glucose, UA Negative Negative    Bilirubin, UA Negative Negative    Ketones, UA Negative Negative    Specific Gravity, UA 1.020 1.005 - 1.030    Blood, UA Negative Negative    pH, UA 5.0 5.0 - 8.0    Protein, UA Negative Negative mg/dL    Urobilinogen, UA 0.2 E.U./dL 0.2 E.U./dL, 1.0 E.U./dL    Nitrite, UA Negative Negative    Leukocytes, UA Trace (!) Negative    Bacteria, UA None Seen None Seen hpf    RBC, UA None Seen None Seen, 0-2 hpf    WBC, UA None Seen None Seen, 0-5 hpf    Squam Epithel, UA None Seen None Seen, 0-5 lpf   Osmolality   Result Value Ref Range    Osmolality, Blood 300 270 - 300 mOsm/kg   Comprehensive Metabolic Panel   Result Value Ref Range    Sodium 141 136 - 145 mmol/L    Potassium 4.3 3.5 - 5.0  mmol/L    Chloride 106 98 - 107 mmol/L    CO2 27 22 - 31 mmol/L    Anion Gap, Calculation 8 5 - 18 mmol/L    Glucose 72 70 - 125 mg/dL    BUN 25 (H) 8 - 22 mg/dL    Creatinine 1.02 0.70 - 1.30 mg/dL    GFR MDRD Af Amer >60 >60 mL/min/1.73m2    GFR MDRD Non Af Amer >60 >60 mL/min/1.73m2    Bilirubin, Total 0.3 0.0 - 1.0 mg/dL    Calcium 9.5 8.5 - 10.5 mg/dL    Protein, Total 6.0 6.0 - 8.0 g/dL    Albumin 3.7 3.5 - 5.0 g/dL    Alkaline Phosphatase 133 (H) 45 - 120 U/L    AST 27 0 - 40 U/L    ALT 23 0 - 45 U/L   Thyroid Stimulating Hormone (TSH)   Result Value Ref Range    TSH 2.06 0.30 - 5.00 uIU/mL   PSA (Prostatic-Specific Antigen), Annual Screen   Result Value Ref Range    PSA 1.8 0.0 - 2.5 ng/mL   Osmolality, Random, Urine   Result Value Ref Range    Osmolality, Urine 653 300 - 900 mOsm/kg   Sodium, Random Urine   Result Value Ref Range    Sodium, Urine 113 mmol/L     No results found.  MEDICATIONS     Current Outpatient Medications on File Prior to Visit   Medication Sig Dispense Refill     ergocalciferol, vitamin D2, (VITAMIN D2 ORAL) Take by mouth.       traZODone (DESYREL) 150 MG tablet TAKE 2 TABLETS BY MOUTH NIGHTLY AT BEDTIME (Patient taking differently: 1 TAB BEDTIME) 180 tablet 1     No current facility-administered medications on file prior to visit.        HEALTH MAINTENANCE / SCREENING   PHQ-2 Total Score: 0 (12/12/2018  2:00 PM)  , PHQ-9 Total Score: 0 (12/12/2018  2:00 PM)  ,DESTINY-7 Total: 3 (12/12/2018  2:00 PM)    Immunization History   Administered Date(s) Administered     Hep A, Adult IM (19yr & older) 06/15/2011     Hep A, historic 06/15/2011     Td, Adult, Absorbed 02/13/2008     Td, adult adsorbed, PF 12/12/2018     Td,adult,historic,unspecified 07/15/2008     Tdap 07/15/2008     Health Maintenance   Topic     INFLUENZA VACCINE RULE BASED (Season Ended)     ADVANCE DIRECTIVES DISCUSSED WITH PATIENT      TD 18+ HE      TDAP ADULT ONE TIME DOSE        Diana Dutta MD  Family Medicine,  [facility], [place]     This note was dictated using a voice recognition software.  Any grammatical or context distortion are unintentional and inherent to the software.

## 2021-05-31 ENCOUNTER — RECORDS - HEALTHEAST (OUTPATIENT)
Dept: ADMINISTRATIVE | Facility: CLINIC | Age: 46
End: 2021-05-31

## 2021-06-01 VITALS — BODY MASS INDEX: 21.6 KG/M2 | WEIGHT: 173.75 LBS | HEIGHT: 75 IN

## 2021-06-01 NOTE — PROGRESS NOTES
Assessment:      Healthy male exam.        Plan:     1.  Increased urinary frequency associated with nocturia, differential diagnosis discussed, not improving with conservative treatment, refer to urologist for further testing and  care.  2. Patient Counseling:  --Nutrition: Stressed importance of moderation in sodium/caffeine intake, saturated fat and cholesterol, caloric balance, sufficient intake of fresh fruits, vegetables, fiber, calcium, iron.  --Discussed the issue of calcium supplement, and the daily use of baby aspirin.  --Exercise: Stressed the importance of regular exercise.   --Substance Abuse: Discussed cessation/primary prevention of tobacco, alcohol, or other drug use; driving or other dangerous activities under the influence; availability of treatment for abuse.    --Sexuality: Discussed sexually transmitted diseases, partner selection, use of condoms, avoidance of unintended pregnancy.  --Injury prevention: Discussed safety belts, safety helmets, smoke detector, smoking near bedding or upholstery.   --Dental health: Discussed importance of regular tooth brushing, flossing, and dental visits.  --Immunizations reviewed.  --Discussed timing and benefits of screening colonoscopy and alternative options.  --After hours service discussed with patient             -- I have had an Advance Directives discussion with the patient.  The following high BMI interventions were performed this visit: encouragement to exercise    3. Discussed the patient's BMI with him.  The BMI is in the acceptable range  4. Follow up as needed for acute illness     Subjective:      Anson Cifuentes is a 43 y.o. male who presents for an annual exam. The patient reports that there is not domestic violence in his life.   Still having some mild urinary frequency with nocturia, he works as a  and is affecting his production at work.  Currently taking saw palmetto, but recently started on prazosin for insomnia by psychiatrist  but only on that for a few days.  Healthy Habits:   Regular Exercise: Yes  Sunscreen Use: Yes  Healthy Diet: Yes  Dental Visits Regularly: Yes  Seat Belt: Yes  Sexually active: Yes  Monthly Self Testicular Exams:  Yes  Hemoccults: N/A  Flex Sig: N/A  Colonoscopy: N/A  Lipid Profile: Yes  Glucose Screen: Yes  Prevention of Osteoporosis: Yes  Last Dexa: N/A  Guns at Home:  N/A      Immunization History   Administered Date(s) Administered     Hep A, Adult IM (19yr & older) 06/15/2011     Hep A, historic 06/15/2011     Td, Adult, Absorbed 02/13/2008     Td, adult adsorbed, PF 12/12/2018     Td,adult,historic,unspecified 07/15/2008     Tdap 07/15/2008     Immunization status: stated as current, but no records available.  Vision Screening:both eyes  Hearing: PASS     Current Outpatient Medications   Medication Sig Dispense Refill     FLUoxetine (PROZAC) 20 MG capsule Take by mouth 2 (two) times a day.         5     omega-3/dha/epa/fish oil (FISH OIL-OMEGA-3 FATTY ACIDS) 300-1,000 mg capsule Take 2 g by mouth daily.       prazosin (MINIPRESS) 1 MG capsule Take 1 mg by mouth at bedtime.  1     traZODone (DESYREL) 150 MG tablet TAKE 2 TABLETS BY MOUTH NIGHTLY AT BEDTIME (Patient taking differently: 1 TAB BEDTIME) 180 tablet 1     ergocalciferol, vitamin D2, (VITAMIN D2 ORAL) Take by mouth.       No current facility-administered medications for this visit.      No past medical history on file.  No past surgical history on file.  Patient has no known allergies.  Family History   Problem Relation Age of Onset     Cancer Mother      Prostate cancer Father      Diabetes Unknown      Alcohol abuse Other      Breast cancer Sister      Arthritis Maternal Grandmother      Alcohol abuse Maternal Grandfather      Depression Maternal Grandfather      Social History     Socioeconomic History     Marital status:      Spouse name: Not on file     Number of children: Not on file     Years of education: Not on file     Highest  "education level: Not on file   Occupational History     Occupation: Professional    Social Needs     Financial resource strain: Not on file     Food insecurity:     Worry: Not on file     Inability: Not on file     Transportation needs:     Medical: Not on file     Non-medical: Not on file   Tobacco Use     Smoking status: Never Smoker     Smokeless tobacco: Never Used   Substance and Sexual Activity     Alcohol use: Yes     Alcohol/week: 1.2 oz     Types: 2 Cans of beer per week     Drug use: Not on file     Sexual activity: Yes     Partners: Female     Comment:    Lifestyle     Physical activity:     Days per week: Not on file     Minutes per session: Not on file     Stress: Not on file   Relationships     Social connections:     Talks on phone: Not on file     Gets together: Not on file     Attends [employer] service: Not on file     Active member of club or organization: Not on file     Attends meetings of clubs or organizations: Not on file     Relationship status: Not on file     Intimate partner violence:     Fear of current or ex partner: Not on file     Emotionally abused: Not on file     Physically abused: Not on file     Forced sexual activity: Not on file   Other Topics Concern     Not on file   Social History Narrative     Not on file     No specialty comments available.  Review of Systems  General:  Denies problem  Eyes: Denies problem  Ears/Nose/Throat: Denies problem  Cardiovascular: Denies problem  Respiratory:  Denies problem  Gastrointestinal:  Denies problem  Genitourinary: Denies problem  Musculoskeletal:  Denies problem  Skin: Denies problem  Neurologic: Denies problem  Psychiatric: Denies problem  Endocrine: Denies problem  Heme/Lymphatic: Denies problem   Allergic/Immunologic: Denies problem        Objective:     Vitals:    09/18/19 0816   BP: (!) 85/58   Pulse: 70   SpO2: 97%   Weight: 188 lb 4 oz (85.4 kg)   Height: 6' 3.5\" (1.918 m)     Body mass index is 23.22 " kg/m .    Physical  General Appearance: Alert, cooperative, no distress, appears stated age  Head: Normocephalic, without obvious abnormality, atraumatic  Eyes: PERRL, conjunctiva/corneas clear, EOM's intact  Ears: Normal TM's and external ear canals, both ears  Nose: Nares normal, septum midline,mucosa normal, no drainage  Throat: Lips, mucosa, and tongue normal; teeth and gums normal  Neck: Supple, symmetrical, trachea midline, no adenopathy;  thyroid: not enlarged, symmetric, no tenderness/mass/nodules; no carotid bruit or JVD  Back: Symmetric, no curvature, ROM normal, no CVA tenderness  Lungs: Clear to auscultation bilaterally, respirations unlabored  Heart: Regular rate and rhythm, S1 and S2 normal, no murmur, rub, or gallop,  Abdomen: Soft, non-tender, bowel sounds active all four quadrants,  no masses, no organomegaly  Genitourinary: Penis normal. Right and left testis are descended.No palpable masses.   Rectal: No visible external lesion, soft symmetrical minimally enlarged prostate with no palpable nodules.  Musculoskeletal: Normal range of motion. No joint swelling or deformity.   Extremities: Extremities normal, atraumatic, no cyanosis or edema  Skin: Skin color, texture, turgor normal, no rashes or lesions  Lymph nodes: Cervical, supraclavicular, and axillary nodes normal  Neurologic: He is alert. He has normal reflexes.   Psychiatric: He has a normal mood and affect.        MD Anson Barber was seen today for annual exam and urinary frequency.    Diagnoses and all orders for this visit:    Visit for preventive health examination    Anxiety    Screening for HIV without presence of risk factors  -     HIV Antigen/Antibody Screening [place]    Increased frequency of urination  -     Ambulatory referral to Urology    Screening for endocrine, nutritional, metabolic and immunity disorder  -     Lipid Cascade  -     Comprehensive Metabolic Panel

## 2021-06-02 VITALS — HEIGHT: 75 IN | WEIGHT: 168 LBS | BODY MASS INDEX: 20.89 KG/M2

## 2021-06-02 NOTE — TELEPHONE ENCOUNTER
Medication Request  Medication name: Vitamin D3 50,000 units   Pharmacy Name and Location: [facility] # [id number]  Reason for request: patient states he will start taking during winter time .   When did you use medication last?:  Unknown   Patient offered appointment:  N/A  Okay to leave a detailed message: no

## 2021-06-03 VITALS — BODY MASS INDEX: 22.65 KG/M2 | WEIGHT: 186 LBS | HEIGHT: 76 IN

## 2021-06-03 VITALS
SYSTOLIC BLOOD PRESSURE: 85 MMHG | DIASTOLIC BLOOD PRESSURE: 58 MMHG | WEIGHT: 188.25 LBS | OXYGEN SATURATION: 97 % | HEIGHT: 76 IN | BODY MASS INDEX: 22.92 KG/M2 | HEART RATE: 70 BPM

## 2021-06-05 VITALS
SYSTOLIC BLOOD PRESSURE: 104 MMHG | RESPIRATION RATE: 16 BRPM | HEIGHT: 76 IN | BODY MASS INDEX: 23.62 KG/M2 | DIASTOLIC BLOOD PRESSURE: 66 MMHG | HEART RATE: 60 BPM | WEIGHT: 194 LBS

## 2021-06-05 VITALS
HEART RATE: 72 BPM | SYSTOLIC BLOOD PRESSURE: 110 MMHG | DIASTOLIC BLOOD PRESSURE: 70 MMHG | BODY MASS INDEX: 21.58 KG/M2 | OXYGEN SATURATION: 98 % | WEIGHT: 175 LBS | TEMPERATURE: 98.9 F

## 2021-06-09 NOTE — PROGRESS NOTES
Assessment:      Healthy male exam.        Plan:      1.  Depression and anxiety is overall well controlled, we've discussed about possibly tapering the medication down the road as he continues to improve..  2. Patient Counseling:  --Nutrition: Stressed importance of moderation in sodium/caffeine intake, saturated fat and cholesterol, caloric balance, sufficient intake of fresh fruits, vegetables, fiber, calcium, iron.  --Discussed the issue of calcium supplement, and the daily use of baby aspirin.  --Exercise: Stressed the importance of regular exercise.   --Substance Abuse: Discussed cessation/primary prevention of tobacco, alcohol, or other drug use; driving or other dangerous activities under the influence; availability of treatment for abuse.    --Sexuality: Discussed sexually transmitted diseases, partner selection, use of condoms, avoidance of unintended pregnancy.  --Injury prevention: Discussed safety belts, safety helmets, smoke detector, smoking near bedding or upholstery.   --Dental health: Discussed importance of regular tooth brushing, flossing, and dental visits.  --Immunizations reviewed.  --Discussed timing and benefits of screening colonoscopy and alternative options.  --After hours service discussed with patient             -- I have had an Advance Directives discussion with the patient.    3. Discussed the patient's BMI with him.  The BMI is in the acceptable range  4. Follow up as needed for acute illness     Subjective:      Anson Cifuentes is a 41 y.o. male who presents for an annual exam. The patient reports that there is not domestic violence in his life.     Healthy Habits:   Regular Exercise: Yes  Sunscreen Use: Yes  Healthy Diet: Yes  Dental Visits Regularly: Yes  Seat Belt: Yes  Sexually active: Yes  Monthly Self Testicular Exams:  Yes  Hemoccults: N/A  Flex Sig: N/A  Colonoscopy: N/A  Lipid Profile: Yes  Glucose Screen: Yes  Prevention of Osteoporosis: Yes  Last Dexa: N/A  Amanda  at Home:  N/A      Immunization History   Administered Date(s) Administered     Hep A, historic 06/15/2011     Td, historic 07/15/2008     Tdap 07/15/2008     Immunization status: up to date and documented.  Declined Tdap today.  Vision Screening:both eyes  Hearing: PASS     Current Outpatient Prescriptions   Medication Sig Dispense Refill     busPIRone (BUSPAR) 15 MG tablet Take 1 tablet (15 mg total) by mouth 2 (two) times a day. 60 tablet 4     sertraline (ZOLOFT) 50 MG tablet Take 50 mg by mouth.       traZODone (DESYREL) 300 MG tablet Take 1 tablet (300 mg total) by mouth bedtime. 180 tablet 2     No current facility-administered medications for this visit.      No past medical history on file.  No past surgical history on file.  Review of patient's allergies indicates no known allergies.  Family History   Problem Relation Age of Onset     Diabetes       Alcohol abuse Other      Social History     Social History     Marital status:      Spouse name: N/A     Number of children: N/A     Years of education: N/A     Occupational History     Professional       Social History Main Topics     Smoking status: Never Smoker     Smokeless tobacco: Never Used     Alcohol use 1.2 oz/week     2 Cans of beer per week     Drug use: Not on file     Sexual activity: Yes     Partners: Female      Comment:      Other Topics Concern     Not on file     Social History Narrative       Review of Systems  General:  Denies problem  Eyes: Denies problem  Ears/Nose/Throat: Denies problem  Cardiovascular: Denies problem  Respiratory:  Denies problem  Gastrointestinal:  Denies problem  Genitourinary: Denies problem  Musculoskeletal:  Denies problem  Skin: Denies problem  Neurologic: Denies problem  Psychiatric: Denies problem  Endocrine: Denies problem  Heme/Lymphatic: Denies problem   Allergic/Immunologic: Denies problem        Objective:     Vitals:    03/14/17 0857   BP: 104/60   Pulse: 72   Resp: 13   Temp: 97.7  F  "(36.5  C)   TempSrc: Oral   Weight: 170 lb (77.1 kg)   Height: 6' 3.4\" (1.915 m)     Body mass index is 21.02 kg/(m^2).    Physical  General Appearance: Alert, cooperative, no distress, appears stated age  Head: Normocephalic, without obvious abnormality, atraumatic  Eyes: PERRL, conjunctiva/corneas clear, EOM's intact  Ears: Normal TM's and external ear canals, both ears  Nose: Nares normal, septum midline,mucosa normal, no drainage  Throat: Lips, mucosa, and tongue normal; teeth and gums normal  Neck: Supple, symmetrical, trachea midline, no adenopathy;  thyroid: not enlarged, symmetric, no tenderness/mass/nodules; no carotid bruit or JVD  Back: Symmetric, no curvature, ROM normal, no CVA tenderness  Lungs: Clear to auscultation bilaterally, respirations unlabored  Heart: Regular rate and rhythm, S1 and S2 normal, no murmur, rub, or gallop,  Abdomen: Soft, non-tender, bowel sounds active all four quadrants,  no masses, no organomegaly  Genitourinary: Penis normal. Right and left testis are descended.No palpable masses.   Rectal: No visible external lesion  Musculoskeletal: Normal range of motion. No joint swelling or deformity.   Extremities: Extremities normal, atraumatic, no cyanosis or edema  Skin: Skin color, texture, turgor normal, no rashes or lesions  Lymph nodes: Cervical, supraclavicular, and axillary nodes normal  Neurologic: He is alert. He has normal reflexes.   Psychiatric: He has a normal mood and affect.        Diana Dutta MD            "

## 2021-06-11 NOTE — TELEPHONE ENCOUNTER
,  Are you able to place a referral for the [place] I am unable to schedule without one in our system.  Thank you

## 2021-06-11 NOTE — TELEPHONE ENCOUNTER
Received a call from [facility] ultrasound today- pt is awaiting an ultrasound ordered by Dr Dutta on behalf of [facility]rology.  The us was ordered for pelvic floor but us indicates they don't do this generally.  Wondering what exactly we are looking for.     Dr Dutta not in clinic today.  Reviewed notes from his chart thsi month.  Urology consult early Sept recommends renal u/s due to frequent micturation.      I ordered renal/bladder us pre and post void today and will forward results to [facility] and Dr Dutta to review.

## 2021-06-12 NOTE — PROGRESS NOTES
Assessment:      Healthy male exam.        Plan:      1.  History of posttraumatic stress disorder, working with a psychiatrist with medication adjustment.  He also has pelvic floor dysfunction, currently doing physical therapy.  Migraine headaches, we discussed treatment option, will switch to Maxalt see if that will make a difference otherwise consider prophylactic treatment.  right arm lipomatous lesion, confirmed by ultrasound, surgical management discussed, patient preferred observation at this time and he would let us know if there is any new change.  2. Patient Counseling:  --Nutrition: Stressed importance of moderation in sodium/caffeine intake, saturated fat and cholesterol, caloric balance, sufficient intake of fresh fruits, vegetables, fiber, calcium, iron.  --Discussed the issue of calcium supplement, and the daily use of baby aspirin.  --Exercise: Stressed the importance of regular exercise.   --Substance Abuse: Discussed cessation/primary prevention of tobacco, alcohol, or other drug use; driving or other dangerous activities under the influence; availability of treatment for abuse.    --Sexuality: Discussed sexually transmitted diseases, partner selection, use of condoms, avoidance of unintended pregnancy.  --Injury prevention: Discussed safety belts, safety helmets, smoke detector, smoking near bedding or upholstery.   --Dental health: Discussed importance of regular tooth brushing, flossing, and dental visits.  --Immunizations reviewed.  --Discussed timing and benefits of screening colonoscopy and alternative options.  --After hours service discussed with patient             -- I have had an Advance Directives discussion with the patient.  The following high BMI interventions were performed this visit: encouragement to exercise    3. Discussed the patient's BMI with him.  The BMI is in the acceptable range  4. Follow up as needed for acute illness     Subjective:      Anson Cifuentes is a 44  y.o. male who presents for an annual exam. The patient reports that there is not domestic violence in his life.   Currently working with a psychiatrist for medication adjustment, he does have a history of posttraumatic stress disorder, currently on Abilify, Minipress, Prozac, insomnia has been managed with Lunesta alternating with Ambien.  Has been having exacerbation of migraine, but no change in intensity, has to take at least 3 Imitrex a day.  Asking for possible alternative    Healthy Habits:   Regular Exercise: Yes  Sunscreen Use: Yes  Healthy Diet: Yes  Dental Visits Regularly: Yes  Seat Belt: Yes  Sexually active: Yes  Monthly Self Testicular Exams:  Yes  Hemoccults: N/A  Flex Sig: N/A  Colonoscopy: N/A  Lipid Profile: Yes  Glucose Screen: Yes  Prevention of Osteoporosis: Yes  Last Dexa: N/A  Guns at Home:  N/A      Immunization History   Administered Date(s) Administered     Hep A, Adult IM (19yr & older) 06/15/2011     Hep A, historic 06/15/2011     INFLUENZA,SEASONAL QUAD, PF, =/> 6months 10/26/2020     Td, Adult, Absorbed 02/13/2008     Td, adult adsorbed, PF 12/12/2018     Td,adult,historic,unspecified 07/15/2008     Tdap 07/15/2008     Immunization status: stated as current, but no records available.  Vision Screening:both eyes  Hearing: PASS     Current Outpatient Medications   Medication Sig Dispense Refill     ALPRAZolam (XANAX) 2 MG tablet TAKE 1 TO 2 TABLETS BY MOUTH AS NEEDED FOR PANIC ATTACKS       ARIPiprazole (ABILIFY) 10 MG tablet        ergocalciferol (VITAMIN D2) 50,000 unit capsule Take 1 capsule (50,000 Units total) by mouth once a week. 8 capsule 0     FLUoxetine (PROZAC) 20 MG capsule Take by mouth 2 (two) times a day.         5     omega-3/dha/epa/fish oil (FISH OIL-OMEGA-3 FATTY ACIDS) 300-1,000 mg capsule Take 2 g by mouth daily.       prazosin (MINIPRESS) 2 MG capsule Take 2 capsules (4 mg total) by mouth at bedtime. 180 capsule 3     SUMAtriptan (IMITREX) 50 MG tablet Take 1 tablet  (50 mg total) by mouth once as needed for migraine (may repeat x 2 max 150 mg). 30 tablet 2     traZODone (DESYREL) 150 MG tablet TAKE 2 TABLETS BY MOUTH NIGHTLY AT BEDTIME (Patient taking differently: Taking 1.5 tab) 180 tablet 1     rizatriptan (MAXALT) 10 MG tablet Take 1 tablet (10 mg total) by mouth as needed for migraine. May repeat in 2 hours if needed 30 tablet 6     No current facility-administered medications for this visit.      No past medical history on file.  No past surgical history on file.  Patient has no known allergies.  Family History   Problem Relation Age of Onset     Cancer Mother      Prostate cancer Father      Diabetes Unknown      Alcohol abuse Other      Breast cancer Sister      Arthritis Maternal Grandmother      Alcohol abuse Maternal Grandfather      Depression Maternal Grandfather      Social History     Socioeconomic History     Marital status:      Spouse name: Not on file     Number of children: Not on file     Years of education: Not on file     Highest education level: Not on file   Occupational History     Occupation: Professional    Social Needs     Financial resource strain: Not on file     Food insecurity     Worry: Not on file     Inability: Not on file     Transportation needs     Medical: Not on file     Non-medical: Not on file   Tobacco Use     Smoking status: Never Smoker     Smokeless tobacco: Never Used   Substance and Sexual Activity     Alcohol use: Yes     Alcohol/week: 2.0 standard drinks     Types: 2 Cans of beer per week     Drug use: Not on file     Sexual activity: Yes     Partners: Female     Comment:    Lifestyle     Physical activity     Days per week: Not on file     Minutes per session: Not on file     Stress: Not on file   Relationships     Social connections     Talks on phone: Not on file     Gets together: Not on file     Attends Orthodoxy service: Not on file     Active member of club or organization: Not on file     Attends  "meetings of clubs or organizations: Not on file     Relationship status: Not on file     Intimate partner violence     Fear of current or ex partner: Not on file     Emotionally abused: Not on file     Physically abused: Not on file     Forced sexual activity: Not on file   Other Topics Concern     Not on file   Social History Narrative     Not on file     No specialty comments available.  Review of Systems  General:  Denies problem  Eyes: Denies problem  Ears/Nose/Throat: Denies problem  Cardiovascular: Denies problem  Respiratory:  Denies problem  Gastrointestinal:  Denies problem  Genitourinary: Denies problem  Musculoskeletal:  Denies problem  Skin: Denies problem  Neurologic: Denies problem  Psychiatric: Denies problem  Endocrine: Denies problem  Heme/Lymphatic: Denies problem   Allergic/Immunologic: Denies problem        Objective:     Vitals:    10/26/20 0838   BP: 104/66   Pulse: 60   Resp: 16   Weight: 194 lb (88 kg)   Height: 6' 3.5\" (1.918 m)     Body mass index is 23.93 kg/m .    Physical  General Appearance: Alert, cooperative, no distress, appears stated age  Head: Normocephalic, without obvious abnormality, atraumatic  Eyes: PERRL, conjunctiva/corneas clear, EOM's intact  Ears: Normal TM's and external ear canals, both ears  Nose: Nares normal, septum midline,mucosa normal, no drainage  Throat: Lips, mucosa, and tongue normal; teeth and gums normal  Neck: Supple, symmetrical, trachea midline, no adenopathy;  thyroid: not enlarged, symmetric, no tenderness/mass/nodules; no carotid bruit or JVD  Back: Symmetric, no curvature, ROM normal, no CVA tenderness  Lungs: Clear to auscultation bilaterally, respirations unlabored  Heart: Regular rate and rhythm, S1 and S2 normal, no murmur, rub, or gallop,  Abdomen: Soft, non-tender, bowel sounds active all four quadrants,  no masses, no organomegaly  Genitourinary: Penis normal. Right and left testis are descended.No palpable masses.   Rectal: No visible " external lesion  Musculoskeletal: Normal range of motion. No joint swelling or deformity.   Extremities: Extremities normal, atraumatic, no cyanosis or edema  Skin: Small lipomatous lesion 1.5 cm right distal arm area, nontender skin color, texture, turgor normal, no rashes or lesions  Lymph nodes: Cervical, supraclavicular, and axillary nodes normal  Neurologic: He is alert. He has normal reflexes.   Psychiatric: He has a normal mood and affect.        MD Anson Barber was seen today for annual exam.    Diagnoses and all orders for this visit:    Visit for preventive health examination    Migraine with aura and without status migrainosus, not intractable  -     rizatriptan (MAXALT) 10 MG tablet; Take 1 tablet (10 mg total) by mouth as needed for migraine. May repeat in 2 hours if needed    Screening for endocrine, nutritional, metabolic and immunity disorder  -     HM2(CBC w/o Differential)  -     Comprehensive Metabolic Panel  -     Lipid Cascade  -     Thyroid Ida    Skin lump of arm, right    Other orders  -     Influenza, Seasonal Quad, PF =/> 6months

## 2021-06-12 NOTE — TELEPHONE ENCOUNTER
Refill Approved    Rx renewed per Medication Renewal Policy. Medication was last renewed on 9/21/20.    Suzanne Starr, [facility] Triage/Med Refill 10/10/2020     Requested Prescriptions   Pending Prescriptions Disp Refills     prazosin (MINIPRESS) 2 MG capsule 30 capsule 1     Sig: Take 2 capsules (4 mg total) by mouth at bedtime.       Alpha Blockers Refill Protocol  Passed - 10/10/2020 11:16 AM        Passed - PCP or prescribing provider visit in past 12 months       Last office visit with prescriber/PCP: 9/2/2020 Diana Dutta MD OR same dept: Visit date not found OR same specialty: 9/2/2020 Diana Dutta MD  Last physical: 9/18/2019 Last MTM visit: Visit date not found   Next visit within 3 mo: Visit date not found  Next physical within 3 mo: Visit date not found  Prescriber OR PCP: Diana Dutta MD  Last diagnosis associated with med order: 1. PTSD (post-traumatic stress disorder)  - prazosin (MINIPRESS) 2 MG capsule; Take 2 capsules (4 mg total) by mouth at bedtime.  Dispense: 30 capsule; Refill: 1    If protocol passes may refill for 12 months if within 3 months of last provider visit (or a total of 15 months).             Passed - Blood pressure filed in past 12 months     BP Readings from Last 1 Encounters:   09/02/20 110/70

## 2021-06-17 NOTE — PATIENT INSTRUCTIONS - HE
Patient Instructions by Diana Dutta MD at 9/18/2019  8:20 AM     Author: Diana Dutta MD Service: -- Author Type: Physician    Filed: 9/19/2019 10:37 AM Encounter Date: 9/18/2019 Status: Signed    : Diana Dutta MD (Physician)       Patient Education     Prevention Guidelines, Men Ages 40 to 49  Screening tests and vaccines are an important part of managing your health. A screening test is done to find possible disorders or diseases in people who don't have any symptoms. The goal is to find a disease early so lifestyle changes can be made and you can be watched more closely to reduce the risk of disease, or to detect it early enough to treat it most effectively. Screening tests are not considered diagnostic, but are used to determine if more testing is needed. Health counseling is essential, too. Below are guidelines for these, for men ages 40 to 49. Talk with your healthcare provider to make sure youre up to date on what you need.  Screening Who needs it How often   Alcohol misuse All men in this age group At routine exams   Blood pressure All men in this age group Yearly checkup if your blood pressure reading is normal  Normal blood pressure is less than 120/80 mm Hg  If your blood pressure is higher than normal, follow the advice of your healthcare provider      Depression All men in this age group At routine exams   Type 2 diabetes or prediabetes All men beginning at age 45 and men  without symptoms at any age who are overweight or obese and have 1 or more other risk factors for diabetes At least every 3 years (yearly if blood sugar has begun to rise)   Type 2 diabetes All men with prediabetes Every year   Hepatitis C Men at increased risk for infection - talk with your healthcare provider At routine exams   High cholesterol or triglycerides All men ages 35 and older, and younger men at high risk for coronary artery disease At least every 5 years   HIV All men At  routine exams   Obesity All men in this age group At routine exams   Prostate cancer Starting at age 45, talk to healthcare provider about risks and benefits of digital rectal exam (ROXANNE) and prostate-specific antigen (PSA) screening1 At routine exams   Syphilis Men at increased risk for infection - talk with your healthcare provider At routine exams   Tuberculosis Men at increased risk for infection - talk with your healthcare provider Check with your healthcare provider   Vision All men in this age group Every 2 to 4 years if no risk factors for eye disease2   Vaccine Who needs it How often   Chickenpox (varicella) All men in this age group who have no record of this infection or vaccine 2 doses; the second dose should be given at least 4 weeks after the first dose   Hepatitis A Men at increased risk for infection - talk with your healthcare provider 2 doses given at least 6 months apart   Hepatitis B Men at increased risk for infection - talk with your healthcare provider 3 doses over 6 months; second dose should be given 1 month after the first dose; the third dose should be given at least 2 months after the second dose and at least 4 months after the first dose   Haemophilus influenzae Type B (HIB) Men at increased risk for infection - talk with your healthcare provider 1 to 3 doses   Influenza (flu) All men in this age group Once a year   Measles, mumps, rubella (MMR) All men in this age group who have no record of these infections or vaccines 1 or 2 doses   Meningococcal Men at increased risk for infection - talk with your healthcare provider 1 or more doses   Pneumococcal conjugate vaccine (PCV13) and pneumococcal polysaccharide vaccine (PPSV23) Men at increased risk for infection - talk with your healthcare provider PCV13: 1 dose ages 19 to 65 (protects against 13 types of pneumococcal bacteria)     PPSV23: 1 to 2 doses through age 64, or 1 dose at 65 or older (protects against 23 types of pneumococcal  bacteria)      Tetanus/diphtheria/  pertussis (Td/Tdap) booster All men in this age group Td every 10 years, or a one-time dose of Tdap instead of a Td booster after age 18, then Td every 10 years   Counseling Who needs it How often   Diet and exercise Men who are overweight or obese When diagnosed, and then at routine exams   Sexually transmitted infection prevention Men at increased risk for infection - talk with your healthcare provider At routine exams   Use of daily aspirin Men ages 45 to 79 at risk for cardiovascular health problems At routine exams   Use of tobacco and the health effects it can cause All men in this age group Every exam   [address] [place] [address] [facility]   [facility] [employer]  Date Last Reviewed: 2/1/2017 2000-2019 [employer]. [address] [place] [address], [place] [address]. All rights reserved. This information is not intended as a substitute for professional medical care. Always follow your healthcare professional's instructions.

## 2021-06-19 NOTE — PROGRESS NOTES
Assessment:      Healthy male exam.        Plan:     1.  Depression with anxiety, appear well controlled with Prozac 60 mg daily, has been tapering of buspar at the  recommendation of his psychiatrist.  He will continue to follow with him.  He is stating that he will return to get his TD shot.  2. Patient Counseling:  --Nutrition: Stressed importance of moderation in sodium/caffeine intake, saturated fat and cholesterol, caloric balance, sufficient intake of fresh fruits, vegetables, fiber, calcium, iron.  --Discussed the issue of calcium supplement, and the daily use of baby aspirin.  --Exercise: Stressed the importance of regular exercise.   --Substance Abuse: Discussed cessation/primary prevention of tobacco, alcohol, or other drug use; driving or other dangerous activities under the influence; availability of treatment for abuse.    --Sexuality: Discussed sexually transmitted diseases, partner selection, use of condoms, avoidance of unintended pregnancy.  --Injury prevention: Discussed safety belts, safety helmets, smoke detector, smoking near bedding or upholstery.   --Dental health: Discussed importance of regular tooth brushing, flossing, and dental visits.  --Immunizations reviewed.  --Discussed timing and benefits of screening colonoscopy and alternative options.  --After hours service discussed with patient             -- I have had an Advance Directives discussion with the patient.  The following are part of a depression follow up plan for the patient:  under care of mental health counselor    3. Discussed the patient's BMI with him.  The BMI is in the acceptable range  4. Follow up as needed for acute illness     Subjective:      Anson Cifuentes is a 42 y.o. male who presents for an annual exam. The patient reports that there is not domestic violence in his life.   Has been doing fine mentally, has been seeing a psychiatrist currently on Prozac 60 mg daily, has been tapering off BuSpar, PHQ 9  score is 3, DESTINY 7 score is 2.  History of low vitamin D, would like to recheck.  Healthy Habits:   Regular Exercise: Yes  Sunscreen Use: Yes  Healthy Diet: Yes  Dental Visits Regularly: Yes  Seat Belt: Yes  Sexually active: Yes  Monthly Self Testicular Exams:  Yes  Hemoccults: N/A  Flex Sig: N/A  Colonoscopy: N/A  Lipid Profile: No  Glucose Screen: Yes  Prevention of Osteoporosis: Yes  Last Dexa: N/A  Guns at Home:  N/A      Immunization History   Administered Date(s) Administered     Hep A, Adult IM (19yr & older) 06/15/2011     Hep A, historic 06/15/2011     Td, Adult, Absorbed 02/13/2008     Td,adult,historic,unspecified 07/15/2008     Tdap 07/15/2008     Immunization status: stated as current, but no records available.  Vision Screening:both eyes  Hearing: PASS     Current Outpatient Prescriptions   Medication Sig Dispense Refill     busPIRone (BUSPAR) 15 MG tablet TAKE 1 TABLET BY MOUTH TWICE DAILY 60 tablet 3     ergocalciferol (ERGOCALCIFEROL) 50,000 unit capsule TAKE ONE CAPSULE BY MOUTH ONE TIME WEEKLY 12 capsule 1     traZODone (DESYREL) 150 MG tablet TAKE 2 TABLETS BY MOUTH NIGHTLY AT BEDTIME 180 tablet 3     traZODone (DESYREL) 300 MG tablet Take 1 tablet (300 mg total) by mouth bedtime. 180 tablet 2     FLUoxetine (PROZAC) 20 MG capsule Take 60 mg by mouth daily.  2     No current facility-administered medications for this visit.      No past medical history on file.  No past surgical history on file.  Review of patient's allergies indicates no known allergies.  Family History   Problem Relation Age of Onset     Cancer Mother      Prostate cancer Father      Diabetes       Alcohol abuse Other      Breast cancer Sister      Arthritis Maternal Grandmother      Alcohol abuse Maternal Grandfather      Depression Maternal Grandfather      Social History     Social History     Marital status:      Spouse name: N/A     Number of children: N/A     Years of education: N/A     Occupational History      "Professional       Social History Main Topics     Smoking status: Never Smoker     Smokeless tobacco: Never Used     Alcohol use 1.2 oz/week     2 Cans of beer per week     Drug use: Not on file     Sexual activity: Yes     Partners: Female      Comment:      Other Topics Concern     Not on file     Social History Narrative     No specialty comments available.  Review of Systems  General:  Denies problem  Eyes: Denies problem  Ears/Nose/Throat: Denies problem  Cardiovascular: Denies problem  Respiratory:  Denies problem  Gastrointestinal:  Denies problem  Genitourinary: Denies problem  Musculoskeletal:  Denies problem  Skin: Denies problem  Neurologic: Denies problem  Psychiatric: Denies problem  Endocrine: Denies problem  Heme/Lymphatic: Denies problem   Allergic/Immunologic: Denies problem        Objective:     Vitals:    07/05/18 1430   BP: 106/70   Pulse: 62   SpO2: 99%   Weight: 173 lb 12 oz (78.8 kg)   Height: 6' 2.75\" (1.899 m)     Body mass index is 21.86 kg/(m^2).    Physical  General Appearance: Alert, cooperative, no distress, appears stated age  Head: Normocephalic, without obvious abnormality, atraumatic  Eyes: PERRL, conjunctiva/corneas clear, EOM's intact  Ears: Normal TM's and external ear canals, both ears  Nose: Nares normal, septum midline,mucosa normal, no drainage  Throat: Lips, mucosa, and tongue normal; teeth and gums normal  Neck: Supple, symmetrical, trachea midline, no adenopathy;  thyroid: not enlarged, symmetric, no tenderness/mass/nodules; no carotid bruit or JVD  Back: Symmetric, no curvature, ROM normal, no CVA tenderness  Lungs: Clear to auscultation bilaterally, respirations unlabored  Heart: Regular rate and rhythm, S1 and S2 normal, no murmur, rub, or gallop,  Abdomen: Soft, non-tender, bowel sounds active all four quadrants,  no masses, no organomegaly  Genitourinary: Penis normal. Right and left testis are descended.No palpable masses.   Rectal: No visible external " lesion  Musculoskeletal: Normal range of motion. No joint swelling or deformity.   Extremities: Extremities normal, atraumatic, no cyanosis or edema  Skin: Skin color, texture, turgor normal, no rashes or lesions  Lymph nodes: Cervical, supraclavicular, and axillary nodes normal  Neurologic: He is alert. He has normal reflexes.   Psychiatric: He has a normal mood and affect.        MD Anson Barber was seen today for annual exam.    Diagnoses and all orders for this visit:    Visit for preventive health examination    Screening for endocrine, nutritional, metabolic and immunity disorder  -     Comprehensive Metabolic Panel  -     Lipid Cascade FASTING  -     Thyroid Stimulating Hormone (TSH)    Vitamin D deficiency  -     Vitamin D, Total (25-Hydroxy)

## 2021-06-22 ENCOUNTER — COMMUNICATION - HEALTHEAST (OUTPATIENT)
Dept: FAMILY MEDICINE | Facility: CLINIC | Age: 46
End: 2021-06-22

## 2021-06-22 NOTE — PROGRESS NOTES
"OFFICE VISIT - FAMILY MEDICINE     ASSESSMENT AND PLAN     1. Muscle twitch     Muscle twitch on the right side of the neck and shoulder, now improved, we discussed about management with good hydration, magnesium and potassium supplementation, patient will follow with a psychiatrist to readjust his trazodone on fluoxetine.  Return as needed.    CHIEF COMPLAINT   Medication Education Visit (\"TWITCHING NECK, SHOULDER, POSS DUE TO NEW DOSAGE, WENT AWAY LAST WEEK, PROZAC\")    HPI   Anson Cifuentes is a 43 y.o. male.  Past history includes anxiety, bipolar disorder, insomnia felt to be physiologic, and erectile dysfunction.  Managed in the past by [facility]ist.  About 2 weeks ago, patient started having twitching muscle in the neck and shoulder area, no specific trigger that he was aware of, but was able to lower his trazodone dose and stay hydrated symptoms did not improve, he already schedule his appointment, but overall has been doing fine.  Does have an appointment to see psychiatrist next week to see if Prozac does need to be adjusted also.  Currently taking Prozac 60 mg daily, and trazodone 150 mg daily.  Review of Systems As per HPI, otherwise negative.    OBJECTIVE   /68 (Patient Site: Left Arm)   Pulse 60   Temp 98.7  F (37.1  C) (Oral)   Resp 12   Ht 6' 2.75\" (1.899 m)   Wt 168 lb (76.2 kg)   BMI 21.14 kg/m    Physical Exam   Constitutional: He is oriented to person, place, and time. He appears well-developed and well-nourished.   HENT:   Head: Normocephalic and atraumatic.   Neck: Normal range of motion. Neck supple. No JVD present. No tracheal deviation present. No thyromegaly present.   Cardiovascular: Normal rate, regular rhythm, normal heart sounds and intact distal pulses. Exam reveals no gallop and no friction rub.   No murmur heard.  Pulmonary/Chest: Effort normal and breath sounds normal. No respiratory distress. He has no wheezes. He has no rales.   Musculoskeletal: He " exhibits no edema or tenderness.   Lymphadenopathy:     He has no cervical adenopathy.   Neurological: He is alert and oriented to person, place, and time. Coordination normal.   Psychiatric: He has a normal mood and affect. Judgment and thought content normal.       [facility]     Family History   Problem Relation Age of Onset     Cancer Mother      Prostate cancer Father      Diabetes Unknown      Alcohol abuse Other      Breast cancer Sister      Arthritis Maternal Grandmother      Alcohol abuse Maternal Grandfather      Depression Maternal Grandfather      Social History     Socioeconomic History     Marital status:      Spouse name: Not on file     Number of children: Not on file     Years of education: Not on file     Highest education level: Not on file   Social Needs     Financial resource strain: Not on file     Food insecurity - worry: Not on file     Food insecurity - inability: Not on file     Transportation needs - medical: Not on file     Transportation needs - non-medical: Not on file   Occupational History     Occupation: Professional    Tobacco Use     Smoking status: Never Smoker     Smokeless tobacco: Never Used   Substance and Sexual Activity     Alcohol use: Yes     Alcohol/week: 1.2 oz     Types: 2 Cans of beer per week     Drug use: Not on file     Sexual activity: Yes     Partners: Female     Comment:    Other Topics Concern     Not on file   Social History Narrative     Not on file     Relevant history was reviewed with the patient today, unless noted in HPI, nothing is pertinent for this visit.  [facility]     Patient Active Problem List    Diagnosis Date Noted     Visit for preventive health examination 03/12/2017     Seasonal allergies 06/29/2015     Vitamin D deficiency 04/29/2015     Atypical Chest Pain      Anxiety      Overview Note:     Replacement Utility updated for latest IMO load       Esophageal reflux      Insomnia      Male Erectile Disorder      No past surgical  history on file.    RESULTS/CONSULTS (Lab/Rad)   No results found for this or any previous visit (from the past 168 hour(s)).  No results found.  MEDICATIONS     Current Outpatient Medications on File Prior to Visit   Medication Sig Dispense Refill     FLUoxetine (PROZAC) 20 MG capsule Take 60 mg by mouth daily.  2     traZODone (DESYREL) 150 MG tablet TAKE 2 TABLETS BY MOUTH NIGHTLY AT BEDTIME (Patient taking differently: 1 TAB BEDTIME) 180 tablet 1     ergocalciferol (ERGOCALCIFEROL) 50,000 unit capsule TAKE ONE CAPSULE BY MOUTH ONE TIME WEEKLY 12 capsule 1     [DISCONTINUED] busPIRone (BUSPAR) 15 MG tablet TAKE 1 TABLET BY MOUTH TWICE DAILY 60 tablet 3     [DISCONTINUED] traZODone (DESYREL) 300 MG tablet Take 1 tablet (300 mg total) by mouth bedtime. 180 tablet 2     No current facility-administered medications on file prior to visit.        HEALTH MAINTENANCE / SCREENING   PHQ-2 Total Score: 0 (12/12/2018  2:00 PM)  , PHQ-9 Total Score: 0 (12/12/2018  2:00 PM)  ,DESTINY-7 Total: 3 (12/12/2018  2:00 PM)    Immunization History   Administered Date(s) Administered     Hep A, Adult IM (19yr & older) 06/15/2011     Hep A, historic 06/15/2011     Td, Adult, Absorbed 02/13/2008     Td, adult adsorbed, PF 12/12/2018     Td,adult,historic,unspecified 07/15/2008     Tdap 07/15/2008     Health Maintenance   Topic     TDAP ADULT ONE TIME DOSE      INFLUENZA VACCINE RULE BASED (1)     DEPRESSION FOLLOW UP      ADVANCE DIRECTIVES DISCUSSED WITH PATIENT      TD 18+ HE      The following are part of a depression follow up plan for the patient:  under care of mental health counselor and seen by mental health counselor  Diana Dutta MD  Family Medicine, [facility]     This note was dictated using a voice recognition software.  Any grammatical or context distortion are unintentional and inherent to the software.

## 2021-06-25 NOTE — TELEPHONE ENCOUNTER
"Reason for Call:  Medication or medication refill:    Do you use a [facility]?  Name of the [facility] [place]    Name of the medication requested: trazadone 150 mg    Other request: pt said dr osei did give him an rx fort this med a while ago, he had been getting refills from his physciatrist and he said he \"vanished\"   He is hoping that dr osei can refill this rx?    Can we leave a detailed message on this number? Yes    Phone number patient can be reached at: Home number on file [phone] (home)    Best Time: asap    Call taken on 5/26/2021 at 3:35 PM by Marlyn Carrillo    "

## 2021-06-26 NOTE — TELEPHONE ENCOUNTER
Reason for Call:  Patient is calling to request a refill on   FLUoxetine (PROZAC) 20 MG capsule  5 9/10/2019  --   Sig - Route: Take by mouth 2 (two) times a day.      [facility] [place]     Detailed comments:usually gets through his psychologist, who he no longer sees.  Last seen by PCP 10/2020    Phone Number Patient can be reached at: Home number on file [phone] (home)    Best Time: any    Can we leave a detailed message on this number?: Yes    Call taken on 6/22/2021 at 4:03 PM by Oly Horton

## 2021-06-28 ENCOUNTER — RECORDS - HEALTHEAST (OUTPATIENT)
Dept: FAMILY MEDICINE | Facility: CLINIC | Age: 46
End: 2021-06-28

## 2021-06-28 DIAGNOSIS — F43.10 PTSD (POST-TRAUMATIC STRESS DISORDER): ICD-10-CM

## 2021-06-29 NOTE — PROGRESS NOTES
Progress Notes by Diana Dutta MD at 9/2/2020 11:40 AM     Author: Diana Dutta MD Service: -- Author Type: Physician    Filed: 9/3/2020  8:56 AM Encounter Date: 9/2/2020 Status: Signed    : Diana Dutta MD (Physician)       OFFICE VISIT - FAMILY MEDICINE     ASSESSMENT AND PLAN     1. Migraine with aura and without status migrainosus, not intractable  SUMAtriptan (IMITREX) 50 MG tablet   2. Anxiety  ALPRAZolam (XANAX) 0.25 MG tablet   3. Mass of arm, right  US Arm Non Vascular Right   4. PTSD (post-traumatic stress disorder)  prazosin (MINIPRESS) 2 MG capsule   High-grade headaches, we discussed healthy lifestyle changes good sleep, trial of Imitrex etc., possible side effect reviewed.  Posttraumatic stress disorder with anxiety, psychiatry is retired, looking for a new one, short supply of alprazolam to use no more than twice a day prescribed, continue prazosin, establish care with a new psychiatrist.  Right arm mass, differential diagnosis discussed included deep lipoma, treatment options discussed patient preferred ultrasound to start with.    CHIEF COMPLAINT   Migraine (chronic; in the last 6 months to a year the migraines have become more painful, where is seeing stars, unable to sleep and to do work.); Mass (upper rt. arm, lump has grown in size-now you can see lump.); and Medication Management (will be in transition of getting new psychiatrist, until then asking if PCP can manage.)    GIANNA Cifuentes is a 44 y.o. male.  Past history includes anxiety, bipolar disorder, insomnia felt to be physiologic, and erectile dysfunction.  Managed in the past by [facility].    Migraine headaches, has become more frequent and intense in the last 6 months, Excedrin does not seems to help anymore.  Patient describes headache as throbbing, with flashlight, phonophobia photophobia.  Mild nausea at the onset.  But sometimes vomiting during the headaches.   No focal neurological deficit.  Was diagnosed with posttraumatic stress disorder, psychiatrist has retired, looking for a new one, would like me to give him a short supply of his medication, did not like the side effect of Seroquel, Ativan did not seems to help has been on Xanax previously and it was helping for anxiety, also taking prazosin last night.  Daily Prozac total of 40 mg daily.  Taking trazodone at night for sleep.  Denies any suicidal thought.  Right arm mass seems to be getting bigger minimally painful but no limitation of right upper extremity  movement.    Review of Systems As per HPI, otherwise negative.    OBJECTIVE   /70 (Patient Site: Left Arm, Patient Position: Sitting, Cuff Size: Adult Regular)   Pulse 72   Temp 98.9  F (37.2  C) (Tympanic)   Wt 175 lb (79.4 kg) Comment: shoes on  SpO2 98%   BMI 21.58 kg/m    Physical Exam   Constitutional: He is oriented to person, place, and time. He appears well-developed and well-nourished.   HENT:   Head: Normocephalic and atraumatic.   Neck: Normal range of motion. Neck supple. No JVD present. No tracheal deviation present. No thyromegaly present.   Cardiovascular: Normal rate, regular rhythm, normal heart sounds and intact distal pulses. Exam reveals no gallop and no friction rub.   No murmur heard.  Pulmonary/Chest: Effort normal and breath sounds normal. No respiratory distress. He has no wheezes. He has no rales.   Musculoskeletal:         General: No tenderness or edema.        Arms:    Lymphadenopathy:     He has no cervical adenopathy.   Neurological: He is alert and oriented to person, place, and time. Coordination normal.   Psychiatric: He has a normal mood and affect. Judgment and thought content normal.       [facility]     Family History   Problem Relation Age of Onset   ? Cancer Mother    ? Prostate cancer Father    ? Diabetes Unknown    ? Alcohol abuse Other    ? Breast cancer Sister    ? Arthritis Maternal Grandmother    ? Alcohol abuse  Maternal Grandfather    ? Depression Maternal Grandfather      Social History     Socioeconomic History   ? Marital status:      Spouse name: Not on file   ? Number of children: Not on file   ? Years of education: Not on file   ? Highest education level: Not on file   Occupational History   ? Occupation: Professional    Social Needs   ? Financial resource strain: Not on file   ? Food insecurity     Worry: Not on file     Inability: Not on file   ? Transportation needs     Medical: Not on file     Non-medical: Not on file   Tobacco Use   ? Smoking status: Never Smoker   ? Smokeless tobacco: Never Used   Substance and Sexual Activity   ? Alcohol use: Yes     Alcohol/week: 2.0 standard drinks     Types: 2 Cans of beer per week   ? Drug use: Not on file   ? Sexual activity: Yes     Partners: Female     Comment:    Lifestyle   ? Physical activity     Days per week: Not on file     Minutes per session: Not on file   ? Stress: Not on file   Relationships   ? Social connections     Talks on phone: Not on file     Gets together: Not on file     Attends Confucianism service: Not on file     Active member of club or organization: Not on file     Attends meetings of clubs or organizations: Not on file     Relationship status: Not on file   ? Intimate partner violence     Fear of current or ex partner: Not on file     Emotionally abused: Not on file     Physically abused: Not on file     Forced sexual activity: Not on file   Other Topics Concern   ? Not on file   Social History Narrative   ? Not on file     Relevant history was reviewed with the patient today, unless noted in HPI, nothing is pertinent for this visit.  [facility]     Patient Active Problem List    Diagnosis Date Noted   ? Migraine with aura and without status migrainosus, not intractable 09/03/2020   ? PTSD (post-traumatic stress disorder) 09/03/2020   ? Visit for preventive health examination 03/12/2017   ? Seasonal allergies 06/29/2015   ? Vitamin D  deficiency 04/29/2015   ? Atypical Chest Pain    ? Anxiety      Overview Note:     Replacement Utility updated for latest IMO load     ? Esophageal reflux    ? Insomnia    ? Male Erectile Disorder      No past surgical history on file.    RESULTS/CONSULTS (Lab/Rad)   No results found for this or any previous visit (from the past 168 hour(s)).  No results found.  MEDICATIONS     Current Outpatient Medications on File Prior to Visit   Medication Sig Dispense Refill   ? ergocalciferol (VITAMIN D2) 50,000 unit capsule Take 1 capsule (50,000 Units total) by mouth once a week. 8 capsule 0   ? FLUoxetine (PROZAC) 20 MG capsule Take by mouth 2 (two) times a day.         5   ? omega-3/dha/epa/fish oil (FISH OIL-OMEGA-3 FATTY ACIDS) 300-1,000 mg capsule Take 2 g by mouth daily.     ? traZODone (DESYREL) 150 MG tablet TAKE 2 TABLETS BY MOUTH NIGHTLY AT BEDTIME (Patient taking differently: 1 TAB BEDTIME) 180 tablet 1   ? LORazepam (ATIVAN) 0.5 MG tablet TAKE ONE TO TWO TABLETS BY MOUTH ONCE DAILY AS NEEDED FOR PANIC ATTACKS MAX OF TWO TABLETS DAILY     ? QUEtiapine (SEROQUEL) 25 MG tablet TAKE ONE TO FOUR TABLETS BY MOUTH AS NEEDED THREE TIMES DAILY       No current facility-administered medications on file prior to visit.        HEALTH MAINTENANCE / SCREENING   PHQ-2 Total Score: 0 (9/2/2020 11:51 AM)  , No data recorded,No data recorded  Immunization History   Administered Date(s) Administered   ? Hep A, Adult IM (19yr & older) 06/15/2011   ? Hep A, historic 06/15/2011   ? Td, Adult, Absorbed 02/13/2008   ? Td, adult adsorbed, PF 12/12/2018   ? Td,adult,historic,unspecified 07/15/2008   ? Tdap 07/15/2008     Health Maintenance   Topic   ? INFLUENZA VACCINE RULE BASED (1)   ? PREVENTIVE CARE VISIT    ? LIPID    ? ADVANCE CARE PLANNING    ? TD 18+ HE    ? HIV SCREENING    ? TDAP ADULT ONE TIME DOSE    ? HEPATITIS B VACCINES        Diana Dutta MD  Family Medicine, [facility]     This note was  dictated using a voice recognition software.  Any grammatical or context distortion are unintentional and inherent to the software.

## 2021-07-03 NOTE — ADDENDUM NOTE
Addendum Note by Diana Dutta MD at 9/23/2020  5:24 PM     Author: Diana Dutta MD Service: -- Author Type: Physician    Filed: 9/23/2020  5:24 PM Encounter Date: 9/18/2020 Status: Signed    : Diana Dutta MD (Physician)    Addended by: DIANA DUTTA on: 9/23/2020 05:24 PM        Modules accepted: Orders

## 2021-07-07 NOTE — TELEPHONE ENCOUNTER
Reason for Call:  Medication or medication refill:    Do you use a [facility]?  Name of the pharmacy and phone number for the current request:     Name of the medication requested: prozac    Other request:     Can we leave a detailed message on this number? No call back needed    Phone number patient can be reached at: Home number on file [phone] (home)    Best Time: any    Call taken on 6/28/2021 at 9:01 AM by Marlyn Carrillo

## 2021-07-21 ENCOUNTER — OFFICE VISIT (OUTPATIENT)
Dept: FAMILY MEDICINE | Facility: CLINIC | Age: 46
End: 2021-07-21
Payer: COMMERCIAL

## 2021-07-21 VITALS
OXYGEN SATURATION: 98 % | WEIGHT: 160.9 LBS | BODY MASS INDEX: 19.85 KG/M2 | DIASTOLIC BLOOD PRESSURE: 72 MMHG | HEART RATE: 65 BPM | SYSTOLIC BLOOD PRESSURE: 100 MMHG

## 2021-07-21 DIAGNOSIS — F41.0 PANIC ATTACK: Primary | ICD-10-CM

## 2021-07-21 DIAGNOSIS — F43.10 PTSD (POST-TRAUMATIC STRESS DISORDER): ICD-10-CM

## 2021-07-21 PROBLEM — Z00.00 VISIT FOR PREVENTIVE HEALTH EXAMINATION: Status: ACTIVE | Noted: 2017-03-12

## 2021-07-21 PROBLEM — R07.89 ATYPICAL CHEST PAIN: Status: ACTIVE | Noted: 2021-07-21

## 2021-07-21 PROBLEM — G47.00 INSOMNIA: Status: ACTIVE | Noted: 2021-07-21

## 2021-07-21 PROBLEM — K21.9 ESOPHAGEAL REFLUX: Status: ACTIVE | Noted: 2021-07-21

## 2021-07-21 PROBLEM — F52.8 PSYCHOSEXUAL DYSFUNCTION WITH INHIBITED SEXUAL EXCITEMENT: Status: ACTIVE | Noted: 2021-07-21

## 2021-07-21 PROBLEM — R22.31 SKIN LUMP OF ARM, RIGHT: Status: ACTIVE | Noted: 2020-10-27

## 2021-07-21 PROBLEM — F41.1 ANXIETY STATE: Status: ACTIVE | Noted: 2021-07-21

## 2021-07-21 PROBLEM — G43.109 MIGRAINE WITH AURA AND WITHOUT STATUS MIGRAINOSUS, NOT INTRACTABLE: Status: ACTIVE | Noted: 2020-09-03

## 2021-07-21 PROCEDURE — 99214 OFFICE O/P EST MOD 30 MIN: CPT | Performed by: STUDENT IN AN ORGANIZED HEALTH CARE EDUCATION/TRAINING PROGRAM

## 2021-07-21 PROCEDURE — 96127 BRIEF EMOTIONAL/BEHAV ASSMT: CPT | Mod: 59 | Performed by: STUDENT IN AN ORGANIZED HEALTH CARE EDUCATION/TRAINING PROGRAM

## 2021-07-21 RX ORDER — PRAZOSIN HYDROCHLORIDE 5 MG/1
10 CAPSULE ORAL AT BEDTIME
Qty: 60 CAPSULE | Refills: 0 | Status: SHIPPED | OUTPATIENT
Start: 2021-07-21 | End: 2021-08-16

## 2021-07-21 RX ORDER — ALPRAZOLAM 2 MG
2 TABLET ORAL 3 TIMES DAILY PRN
Qty: 10 TABLET | Refills: 0 | Status: SHIPPED | OUTPATIENT
Start: 2021-07-21 | End: 2023-05-15

## 2021-07-21 ASSESSMENT — ANXIETY QUESTIONNAIRES
4. TROUBLE RELAXING: MORE THAN HALF THE DAYS
5. BEING SO RESTLESS THAT IT IS HARD TO SIT STILL: NOT AT ALL
3. WORRYING TOO MUCH ABOUT DIFFERENT THINGS: SEVERAL DAYS
7. FEELING AFRAID AS IF SOMETHING AWFUL MIGHT HAPPEN: SEVERAL DAYS
2. NOT BEING ABLE TO STOP OR CONTROL WORRYING: MORE THAN HALF THE DAYS
GAD7 TOTAL SCORE: 9
1. FEELING NERVOUS, ANXIOUS, OR ON EDGE: MORE THAN HALF THE DAYS
6. BECOMING EASILY ANNOYED OR IRRITABLE: SEVERAL DAYS

## 2021-07-21 ASSESSMENT — PATIENT HEALTH QUESTIONNAIRE - PHQ9: SUM OF ALL RESPONSES TO PHQ QUESTIONS 1-9: 14
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
## 2021-08-12 DIAGNOSIS — F43.10 PTSD (POST-TRAUMATIC STRESS DISORDER): ICD-10-CM

## 2021-08-16 RX ORDER — PRAZOSIN HYDROCHLORIDE 5 MG/1
10 CAPSULE ORAL AT BEDTIME
Qty: 180 CAPSULE | Refills: 3 | Status: SHIPPED | OUTPATIENT
Start: 2021-08-16 | End: 2022-06-22

## 2021-08-16 NOTE — TELEPHONE ENCOUNTER
"Last Written Prescription Date:  7/21/21  Last Fill Quantity: 60,  # refills: 0   Last office visit provider:  7/21/21     Requested Prescriptions   Pending Prescriptions Disp Refills     prazosin (MINIPRESS) 5 MG capsule [Pharmacy Med Name: PRAZOSIN 5 MG CAPSULE] 60 capsule 0     Sig: TAKE 2 CAPSULES (10 MG) BY MOUTH AT BEDTIME       Alpha Blockers Passed - 8/12/2021 12:25 AM        Passed - Blood pressure under 140/90 in past 12 months     BP Readings from Last 3 Encounters:   07/21/21 100/72   10/26/20 104/66   09/02/20 110/70                 Passed - Recent (12 mo) or future (30 days) visit within the authorizing provider's specialty     Patient has had an office visit with the authorizing provider or a provider within the authorizing providers department within the previous 12 mos or has a future within next 30 days. See \"Patient Info\" tab in [employer], or \"Choose Columns\" in Meds & Orders section of the refill encounter.              Passed - Patient does not have Tadalafil, Vardenafil, or Sildenafil on their medication list        Passed - Medication is active on med list        Passed - Patient is 18 years of age or older       Antiadrenergic Antihypertensives Passed - 8/12/2021 12:25 AM        Passed - Blood pressure less than 140/90 in past 6 months     BP Readings from Last 3 Encounters:   07/21/21 100/72   10/26/20 104/66   09/02/20 110/70                 Passed - Medication is active on med list        Passed - Patient is age 18 or older        Passed - Normal serum creatinine on file in past 12 months     Recent Labs   Lab Test 10/26/20  0917   CR 1.03       Ok to refill medication if creatinine is low          Passed - Recent (6 mo) or future (30 days) visit within the authorizing provider's specialty     Patient had office visit in the last 6 months or has a visit in the next 30 days with authorizing provider or within the authorizing provider's specialty.  See \"Patient Info\" tab in [employer], or " "\"Choose Columns\" in Meds & Orders section of the refill encounter.                 Tejinder Isaac RN 08/16/21 7:09 AM  "

## 2021-09-04 ENCOUNTER — HEALTH MAINTENANCE LETTER (OUTPATIENT)
Age: 46
End: 2021-09-04

## 2021-10-11 NOTE — TELEPHONE ENCOUNTER
DIAGNOSIS: (B) knee pain / * no imagining* self referral   APPOINTMENT DATE: 10.19.21   NOTES STATUS DETAILS   OFFICE NOTE from referring provider N/A    OFFICE NOTE from other specialist N/A    DISCHARGE SUMMARY from hospital N/A    DISCHARGE REPORT from the ER N/A    OPERATIVE REPORT N/A    EMG report N/A    MEDICATION LIST Internal    MRI N/A    DEXA (osteoporosis/bone health) N/A    CT SCAN N/A    XRAYS (IMAGES & REPORTS) N/A

## 2021-10-18 DIAGNOSIS — M25.561 BILATERAL KNEE PAIN: Primary | ICD-10-CM

## 2021-10-18 DIAGNOSIS — M25.562 BILATERAL KNEE PAIN: Primary | ICD-10-CM

## 2021-10-19 ENCOUNTER — PRE VISIT (OUTPATIENT)
Dept: ORTHOPEDICS | Facility: CLINIC | Age: 46
End: 2021-10-19

## 2021-11-12 DIAGNOSIS — F43.10 PTSD (POST-TRAUMATIC STRESS DISORDER): ICD-10-CM

## 2021-11-15 NOTE — TELEPHONE ENCOUNTER
"Routing refill request to provider for review/approval because:  PCP to review - early refill request.    Last Written Prescription Date:  6/28/2021  Last Fill Quantity: 60,  # refills: 5   Last office visit provider:  7/21/2021 Dr. Cabrera     FLUoxetine (PROZAC) 20 MG capsule 60 capsule 5 6/28/2021  No   Sig - Route: Take 1 capsule (20 mg total) by mouth 2 (two) times a day. - Oral   Sent to pharmacy as: FLUoxetine 20 mg capsule (PROzac)   E-Prescribing Status: Receipt confirmed by pharmacy (6/28/2021  4:35 PM CDT       Requested Prescriptions   Pending Prescriptions Disp Refills     FLUoxetine (PROZAC) 20 MG capsule [Pharmacy Med Name: FLUOXETINE HCL 20 MG CAPSULE] 180 capsule 1     Sig: TAKE 1 CAPSULE BY MOUTH TWICE A DAY       SSRIs Protocol Passed - 11/12/2021  3:50 PM        Passed - Recent (12 mo) or future (30 days) visit within the authorizing provider's specialty     Patient has had an office visit with the authorizing provider or a provider within the authorizing providers department within the previous 12 mos or has a future within next 30 days. See \"Patient Info\" tab in inbasket, or \"Choose Columns\" in Meds & Orders section of the refill encounter.              Passed - Medication is active on med list        Passed - Patient is age 18 or older             Natalya Harkins RN 11/14/21 7:44 PM  "

## 2021-11-23 ENCOUNTER — ANCILLARY PROCEDURE (OUTPATIENT)
Dept: GENERAL RADIOLOGY | Facility: CLINIC | Age: 46
End: 2021-11-23
Attending: PREVENTIVE MEDICINE
Payer: COMMERCIAL

## 2021-11-23 ENCOUNTER — OFFICE VISIT (OUTPATIENT)
Dept: ORTHOPEDICS | Facility: CLINIC | Age: 46
End: 2021-11-23
Payer: COMMERCIAL

## 2021-11-23 VITALS — BODY MASS INDEX: 23.14 KG/M2 | WEIGHT: 190 LBS | RESPIRATION RATE: 16 BRPM | HEIGHT: 76 IN

## 2021-11-23 DIAGNOSIS — M25.562 BILATERAL KNEE PAIN: ICD-10-CM

## 2021-11-23 DIAGNOSIS — M25.562 CHRONIC PAIN OF BOTH KNEES: ICD-10-CM

## 2021-11-23 DIAGNOSIS — M25.561 CHRONIC PAIN OF BOTH KNEES: ICD-10-CM

## 2021-11-23 DIAGNOSIS — M17.10 PATELLOFEMORAL ARTHRITIS: Primary | ICD-10-CM

## 2021-11-23 DIAGNOSIS — M25.561 BILATERAL KNEE PAIN: ICD-10-CM

## 2021-11-23 DIAGNOSIS — G89.29 CHRONIC PAIN OF BOTH KNEES: ICD-10-CM

## 2021-11-23 DIAGNOSIS — M17.0 OSTEOARTHRITIS OF BOTH KNEES, UNSPECIFIED OSTEOARTHRITIS TYPE: ICD-10-CM

## 2021-11-23 PROCEDURE — 73562 X-RAY EXAM OF KNEE 3: CPT | Mod: RT | Performed by: RADIOLOGY

## 2021-11-23 PROCEDURE — 99203 OFFICE O/P NEW LOW 30 MIN: CPT | Performed by: PREVENTIVE MEDICINE

## 2021-11-23 RX ORDER — CELECOXIB 100 MG/1
100 CAPSULE ORAL 2 TIMES DAILY PRN
Qty: 60 CAPSULE | Refills: 0 | Status: SHIPPED | OUTPATIENT
Start: 2021-11-23 | End: 2022-01-03

## 2021-11-23 RX ORDER — HYALURONATE SODIUM 10 MG/ML
20 SYRINGE (ML) INTRAARTICULAR WEEKLY
Status: ACTIVE | OUTPATIENT
Start: 2021-11-23

## 2021-11-23 RX ORDER — GABAPENTIN 600 MG/1
TABLET ORAL
COMMUNITY
Start: 2021-11-12 | End: 2022-06-01

## 2021-11-23 ASSESSMENT — MIFFLIN-ST. JEOR: SCORE: 1846.84

## 2021-11-23 NOTE — PATIENT INSTRUCTIONS
Follow up in 1 month. Visco supplement injection has been ordered. We will reach out to you when we hear back from prior authorization. Knee Exercises have been snail mailed to you.

## 2021-11-23 NOTE — PROGRESS NOTES
HISTORY OF PRESENT ILLNESS  Mr. Cifuentes is a pleasant 45 year old year old male who presents to clinic today with bilateral knee pain  Anson explains that he has had on/off knee pain for years  Has had increase pain with increased physical activities  Uses otcs with some improvement at times  Location: bilateral knees  Quality:  achy pain    Severity: 6/10 at worst    Duration: years, worse over past 6 months  Timing: occurs intermittently  Context: occurs while exercising and lifting  Modifying factors:  resting and non-use makes it better, movement and use makes it worse  Associated signs & symptoms: swelling at times    MEDICAL HISTORY  Patient Active Problem List   Diagnosis     High-tone pelvic floor dysfunction     Muscular incoordination     Urinary frequency     Pelvic pain in male     Anxiety state     Atypical chest pain     Esophageal reflux     Insomnia     Migraine with aura and without status migrainosus, not intractable     Psychosexual dysfunction with inhibited sexual excitement     PTSD (post-traumatic stress disorder)     Seasonal allergies     Skin lump of arm, right     Visit for preventive health examination     Vitamin D deficiency       Current Outpatient Medications   Medication Sig Dispense Refill     ALPRAZolam (XANAX) 2 MG tablet Take 1 tablet (2 mg) by mouth 3 times daily as needed for anxiety 10 tablet 0     ALPRAZolam (XANAX) 2 MG tablet [ALPRAZOLAM (XANAX) 2 MG TABLET] TAKE 1 TO 2 TABLETS BY MOUTH AS NEEDED FOR PANIC ATTACKS       ergocalciferol (VITAMIN D2) 50,000 unit capsule [ERGOCALCIFEROL (VITAMIN D2) 50,000 UNIT CAPSULE] Take 1 capsule (50,000 Units total) by mouth once a week. 8 capsule 0     FLUoxetine (PROZAC) 20 MG capsule TAKE 1 CAPSULE BY MOUTH TWICE A DAY (Patient taking differently: 60 mg ) 180 capsule 1     gabapentin (NEURONTIN) 600 MG tablet        omega-3/dha/epa/fish oil (FISH OIL-OMEGA-3 FATTY ACIDS) 300-1,000 mg capsule [OMEGA-3/DHA/EPA/FISH OIL (FISH  OIL-OMEGA-3 FATTY ACIDS) 300-1,000 MG CAPSULE] Take 2 g by mouth daily.       prazosin (MINIPRESS) 5 MG capsule TAKE 2 CAPSULES (10 MG) BY MOUTH AT BEDTIME 180 capsule 3     rizatriptan (MAXALT) 10 MG tablet [RIZATRIPTAN (MAXALT) 10 MG TABLET] Take 1 tablet (10 mg total) by mouth as needed for migraine. May repeat in 2 hours if needed 30 tablet 6     traZODone (DESYREL) 150 MG tablet [TRAZODONE (DESYREL) 150 MG TABLET] Taking 1.5 tab 180 tablet 1       Allergies   Allergen Reactions     Seasonal Allergies        Family History   Problem Relation Age of Onset     Cancer Mother      Prostate Cancer Father      Diabetes Other      Alcoholism Other      Breast Cancer Sister      Arthritis Maternal Grandmother      Alcoholism Maternal Grandfather      Depression Maternal Grandfather      Social History     Socioeconomic History     Marital status:      Spouse name: None     Number of children: None     Years of education: None     Highest education level: None   Occupational History     None   Tobacco Use     Smoking status: Never Smoker     Smokeless tobacco: Never Used   Substance and Sexual Activity     Alcohol use: Yes     Alcohol/week: 2.0 standard drinks     Drug use: None     Sexual activity: Yes     Partners: Female     Comment:    Other Topics Concern     None   Social History Narrative     None     Social Determinants of Health     Financial Resource Strain: Not on file   Food Insecurity: Not on file   Transportation Needs: Not on file   Physical Activity: Not on file   Stress: Not on file   Social Connections: Not on file   Intimate Partner Violence: Not on file   Housing Stability: Not on file       Additional medical/Social/Surgical histories reviewed in [facility] and updated as appropriate.     REVIEW OF SYSTEMS (11/23/2021)  10 point ROS of systems including Constitutional, Eyes, Respiratory, Cardiovascular, Gastroenterology, Genitourinary, Integumentary, Musculoskeletal, Psychiatric,  "Allergic/Immunologic were all negative except for pertinent positives noted in my HPI.     PHYSICAL EXAM  Vitals:    11/23/21 1402   Resp: 16   Weight: 86.2 kg (190 lb)   Height: 1.92 m (6' 3.59\")     Vital Signs: Resp 16   Ht 1.92 m (6' 3.59\")   Wt 86.2 kg (190 lb)   BMI 23.38 kg/m   Patient declined being weighed. Body mass index is 23.38 kg/m .    General  - normal appearance, in no obvious distress  HEENT  - conjunctivae not injected, moist mucous membranes, normocephalic/atraumatic head, ears normal appearance, no lesions, mouth normal appearance, no scars, normal dentition and teeth present  CV  - normal popliteal pulse  Pulm  - normal respiratory pattern, non-labored  Musculoskeletal - bilateral knee  - stance: normal gait without limp, no obvious leg length discrepancy, single-leg squat exhibits knee valgus, internal rotation of the hip, contralateral hip drop  - inspection: no swelling or effusion, normal muscle tone, normal bone and joint alignment, no obvious deformity  - palpation: no joint line tenderness, patellar tendon non-tender, tender medial patellar facet  - ROM: 135 degrees flexion, -5 degrees extension, not painful, crepitus with weight-bearing flexion  - strength: 5/5 in flexion, 5/5 in extension  - neuro: no sensory or motor deficit  - special tests:  (-) Lachman  (-) anterior drawer  (-) Mai  (-) Thessaly  (-) varus at 0 and 30 degrees flexion  (-) valgus at 0 and 30 degrees flexion  (+) Gwyn s compression test  (+) patellar grind  (-) patellar apprehension  Neuro  - no sensory or motor deficit, grossly normal coordination, normal muscle tone  Skin  - no ecchymosis, erythema, warmth, or induration, no obvious rash  Psych  - interactive, appropriate, normal mood and affect    ASSESSMENT & PLAN  47 yo male with bilateral knee pain due to patellofemoral pain    I independently reviewed the following imaging studies:  Bilateral knee xrays: shows PF arthritis  Given home exercise " program  Consider more PT  RX given for celebrex  Will consider HA injections for knees, obtain PA  F/u within 1 month    Appropriate PPE was utilized for prevention of spread of Covid-19.  Leo Bright MD, CAM

## 2021-11-23 NOTE — NURSING NOTE
"Reason For Visit:   Chief Complaint   Patient presents with     Consult     bilateral knee pain. started a couple years ago. hx of being a  for 10 years, was also a runner. Most painful while carrying furniture on stairs when working.        Resp 16   Ht 1.92 m (6' 3.59\")   Wt 86.2 kg (190 lb)   BMI 23.38 kg/m      Pain Assessment  Patient Currently in Pain: Yes  0-10 Pain Scale: 6  Primary Pain Location: Knee  Aggravating Factors: Stairs,Exercise    Asiha Curtis ATC       "

## 2021-11-23 NOTE — LETTER
11/23/2021      RE: Anson Cifuentes  [address] [facility] [address]       HISTORY OF PRESENT ILLNESS  Mr. Cifuentes is a pleasant 45 year old year old male who presents to clinic today with bilateral knee pain  Anson explains that he has had on/off knee pain for years  Has had increase pain with increased physical activities  Uses otcs with some improvement at times  Location: bilateral knees  Quality:  achy pain    Severity: 6/10 at worst    Duration: years, worse over past 6 months  Timing: occurs intermittently  Context: occurs while exercising and lifting  Modifying factors:  resting and non-use makes it better, movement and use makes it worse  Associated signs & symptoms: swelling at times    MEDICAL HISTORY  Patient Active Problem List   Diagnosis     High-tone pelvic floor dysfunction     Muscular incoordination     Urinary frequency     Pelvic pain in male     Anxiety state     Atypical chest pain     Esophageal reflux     Insomnia     Migraine with aura and without status migrainosus, not intractable     Psychosexual dysfunction with inhibited sexual excitement     PTSD (post-traumatic stress disorder)     Seasonal allergies     Skin lump of arm, right     Visit for preventive health examination     Vitamin D deficiency       Current Outpatient Medications   Medication Sig Dispense Refill     ALPRAZolam (XANAX) 2 MG tablet Take 1 tablet (2 mg) by mouth 3 times daily as needed for anxiety 10 tablet 0     ALPRAZolam (XANAX) 2 MG tablet [ALPRAZOLAM (XANAX) 2 MG TABLET] TAKE 1 TO 2 TABLETS BY MOUTH AS NEEDED FOR PANIC ATTACKS       ergocalciferol (VITAMIN D2) 50,000 unit capsule [ERGOCALCIFEROL (VITAMIN D2) 50,000 UNIT CAPSULE] Take 1 capsule (50,000 Units total) by mouth once a week. 8 capsule 0     FLUoxetine (PROZAC) 20 MG capsule TAKE 1 CAPSULE BY MOUTH TWICE A DAY (Patient taking differently: 60 mg ) 180 capsule 1     gabapentin (NEURONTIN) 600 MG tablet        omega-3/dha/epa/fish oil (FISH  OIL-OMEGA-3 FATTY ACIDS) 300-1,000 mg capsule [OMEGA-3/DHA/EPA/FISH OIL (FISH OIL-OMEGA-3 FATTY ACIDS) 300-1,000 MG CAPSULE] Take 2 g by mouth daily.       prazosin (MINIPRESS) 5 MG capsule TAKE 2 CAPSULES (10 MG) BY MOUTH AT BEDTIME 180 capsule 3     rizatriptan (MAXALT) 10 MG tablet [RIZATRIPTAN (MAXALT) 10 MG TABLET] Take 1 tablet (10 mg total) by mouth as needed for migraine. May repeat in 2 hours if needed 30 tablet 6     traZODone (DESYREL) 150 MG tablet [TRAZODONE (DESYREL) 150 MG TABLET] Taking 1.5 tab 180 tablet 1       Allergies   Allergen Reactions     Seasonal Allergies        Family History   Problem Relation Age of Onset     Cancer Mother      Prostate Cancer Father      Diabetes Other      Alcoholism Other      Breast Cancer Sister      Arthritis Maternal Grandmother      Alcoholism Maternal Grandfather      Depression Maternal Grandfather      Social History     Socioeconomic History     Marital status:      Spouse name: None     Number of children: None     Years of education: None     Highest education level: None   Occupational History     None   Tobacco Use     Smoking status: Never Smoker     Smokeless tobacco: Never Used   Substance and Sexual Activity     Alcohol use: Yes     Alcohol/week: 2.0 standard drinks     Drug use: None     Sexual activity: Yes     Partners: Female     Comment:    Other Topics Concern     None   Social History Narrative     None     Social Determinants of Health     Financial Resource Strain: Not on file   Food Insecurity: Not on file   Transportation Needs: Not on file   Physical Activity: Not on file   Stress: Not on file   Social Connections: Not on file   Intimate Partner Violence: Not on file   Housing Stability: Not on file       Additional medical/Social/Surgical histories reviewed in [facility] and updated as appropriate.     REVIEW OF SYSTEMS (11/23/2021)  10 point ROS of systems including Constitutional, Eyes, Respiratory, Cardiovascular,  "Gastroenterology, Genitourinary, Integumentary, Musculoskeletal, Psychiatric, Allergic/Immunologic were all negative except for pertinent positives noted in my HPI.     PHYSICAL EXAM  Vitals:    11/23/21 1402   Resp: 16   Weight: 86.2 kg (190 lb)   Height: 1.92 m (6' 3.59\")     Vital Signs: Resp 16   Ht 1.92 m (6' 3.59\")   Wt 86.2 kg (190 lb)   BMI 23.38 kg/m   Patient declined being weighed. Body mass index is 23.38 kg/m .    General  - normal appearance, in no obvious distress  HEENT  - conjunctivae not injected, moist mucous membranes, normocephalic/atraumatic head, ears normal appearance, no lesions, mouth normal appearance, no scars, normal dentition and teeth present  CV  - normal popliteal pulse  Pulm  - normal respiratory pattern, non-labored  Musculoskeletal - bilateral knee  - stance: normal gait without limp, no obvious leg length discrepancy, single-leg squat exhibits knee valgus, internal rotation of the hip, contralateral hip drop  - inspection: no swelling or effusion, normal muscle tone, normal bone and joint alignment, no obvious deformity  - palpation: no joint line tenderness, patellar tendon non-tender, tender medial patellar facet  - ROM: 135 degrees flexion, -5 degrees extension, not painful, crepitus with weight-bearing flexion  - strength: 5/5 in flexion, 5/5 in extension  - neuro: no sensory or motor deficit  - special tests:  (-) Lachman  (-) anterior drawer  (-) Mai  (-) Thessaly  (-) varus at 0 and 30 degrees flexion  (-) valgus at 0 and 30 degrees flexion  (+) Gwyn s compression test  (+) patellar grind  (-) patellar apprehension  Neuro  - no sensory or motor deficit, grossly normal coordination, normal muscle tone  Skin  - no ecchymosis, erythema, warmth, or induration, no obvious rash  Psych  - interactive, appropriate, normal mood and affect    ASSESSMENT & PLAN  47 yo male with bilateral knee pain due to patellofemoral pain    I independently reviewed the following imaging " studies:  Bilateral knee xrays: shows PF arthritis  Given home exercise program  Consider more PT  RX given for celebrex  Will consider HA injections for knees, obtain PA  F/u within 1 month    Appropriate PPE was utilized for prevention of spread of Covid-19.  Leo Bright MD, CAQSM

## 2021-11-24 ENCOUNTER — TELEPHONE (OUTPATIENT)
Dept: ORTHOPEDICS | Facility: CLINIC | Age: 46
End: 2021-11-24
Payer: COMMERCIAL

## 2021-11-24 NOTE — TELEPHONE ENCOUNTER
LVM informing pt that Euflexxa is approved. Provided call center number and encouraged pt to call. Dr Cedillo did request a 1 month follow up if pt wants to hold off on euflexxa scheduled.     Please schedule 3 appt 1 week apart with Dr Cedillo. Or just the 1 month follow up.     Aisha Curtis ATC

## 2021-11-26 ENCOUNTER — MYC MEDICAL ADVICE (OUTPATIENT)
Dept: ORTHOPEDICS | Facility: CLINIC | Age: 46
End: 2021-11-26
Payer: COMMERCIAL

## 2021-12-06 ENCOUNTER — TELEPHONE (OUTPATIENT)
Dept: ORTHOPEDICS | Facility: CLINIC | Age: 46
End: 2021-12-06
Payer: COMMERCIAL

## 2021-12-06 NOTE — TELEPHONE ENCOUNTER
[facility] Call Center    Phone Message    May a detailed message be left on voicemail: yes     Reason for Call: Other: Patient received approval from insurance to receive 3 injections; however, he doesn't know how he would go about scheduling them or even what type of injections they're supposed to be.      Please either reach out to patient to discuss or alert scheduling team so we can schedule them.    Action Taken: Message routed to:  [facility] sports    Travel Screening: Not Applicable

## 2021-12-06 NOTE — TELEPHONE ENCOUNTER
Called pt and helped him schedule all three Euflexxa injections. Pt had no further questions and was appreciative of the call.    Bret Mccurdy MA, LAT, ATC

## 2021-12-25 ENCOUNTER — HEALTH MAINTENANCE LETTER (OUTPATIENT)
Age: 46
End: 2021-12-25

## 2022-01-01 DIAGNOSIS — G89.29 CHRONIC PAIN OF BOTH KNEES: ICD-10-CM

## 2022-01-01 DIAGNOSIS — M25.562 CHRONIC PAIN OF BOTH KNEES: ICD-10-CM

## 2022-01-01 DIAGNOSIS — M17.10 PATELLOFEMORAL ARTHRITIS: ICD-10-CM

## 2022-01-01 DIAGNOSIS — M25.561 CHRONIC PAIN OF BOTH KNEES: ICD-10-CM

## 2022-01-03 DIAGNOSIS — M17.0 OSTEOARTHRITIS OF BOTH KNEES, UNSPECIFIED OSTEOARTHRITIS TYPE: Primary | ICD-10-CM

## 2022-01-03 RX ORDER — CELECOXIB 100 MG/1
100 CAPSULE ORAL 2 TIMES DAILY PRN
Qty: 60 CAPSULE | Refills: 0 | Status: SHIPPED | OUTPATIENT
Start: 2022-01-03 | End: 2022-02-04

## 2022-01-03 RX ORDER — HYALURONATE SODIUM 10 MG/ML
20 SYRINGE (ML) INTRAARTICULAR WEEKLY
Status: ACTIVE | OUTPATIENT
Start: 2022-01-03

## 2022-01-04 ENCOUNTER — OFFICE VISIT (OUTPATIENT)
Dept: ORTHOPEDICS | Facility: CLINIC | Age: 47
End: 2022-01-04
Payer: COMMERCIAL

## 2022-01-04 VITALS — WEIGHT: 175 LBS | BODY MASS INDEX: 21.31 KG/M2 | HEIGHT: 76 IN | RESPIRATION RATE: 17 BRPM

## 2022-01-04 DIAGNOSIS — M17.0 ARTHRITIS OF BOTH KNEES: Primary | ICD-10-CM

## 2022-01-04 PROCEDURE — 20610 DRAIN/INJ JOINT/BURSA W/O US: CPT | Mod: 50 | Performed by: PREVENTIVE MEDICINE

## 2022-01-04 PROCEDURE — 99207 PR DROP WITH A PROCEDURE: CPT | Performed by: PREVENTIVE MEDICINE

## 2022-01-04 RX ORDER — LIDOCAINE HYDROCHLORIDE 10 MG/ML
4 INJECTION, SOLUTION EPIDURAL; INFILTRATION; INTRACAUDAL; PERINEURAL
Status: SHIPPED | OUTPATIENT
Start: 2022-01-04

## 2022-01-04 RX ORDER — HYALURONATE SODIUM 10 MG/ML
20 SYRINGE (ML) INTRAARTICULAR
Status: SHIPPED | OUTPATIENT
Start: 2022-01-04

## 2022-01-04 RX ADMIN — LIDOCAINE HYDROCHLORIDE 4 ML: 10 INJECTION, SOLUTION EPIDURAL; INFILTRATION; INTRACAUDAL; PERINEURAL at 15:12

## 2022-01-04 RX ADMIN — Medication 20 MG: at 15:12

## 2022-01-04 ASSESSMENT — MIFFLIN-ST. JEOR: SCORE: 1773.8

## 2022-01-04 ASSESSMENT — PATIENT HEALTH QUESTIONNAIRE - PHQ9: SUM OF ALL RESPONSES TO PHQ QUESTIONS 1-9: 1

## 2022-01-04 NOTE — PROGRESS NOTES
Anson returns for bilateral knee injections with euflexxa as planned.    Diagnosis: bilateral knee arthritis    PROCEDURE:       bilateral     Knee joint injection   The patient was apprised of the risks and the benefits of the procedure and consented and a written consent was signed.   The patient s  KNEEs were sterilely prepped with chloraprep.   The patient was injected with euflexxa  into the R and L knee with a 1.5-inch 22-gauge needle at the_superiorlateral aspect of their knee joint  There were no complications. The patient tolerated the procedure well. There was minimal bleeding.   The patient was instructed to ice the knees upon leaving clinic and refrain from overuse over the next 3 days.   The patient was instructed to go to the emergency room with any usual pain, swelling, or redness occurred in the injected area.   The patient was given a followup appointment to evaluate response to the injection to their increased range of motion and reduction of pain.  Follow up for euflexxa  Dr Leo Bright

## 2022-01-04 NOTE — NURSING NOTE
"Reason For Visit:   Chief Complaint   Patient presents with     Procedure     bilateral euflexxa injections       Resp 17   Ht 1.92 m (6' 3.59\")   Wt 79.4 kg (175 lb)   BMI 21.53 kg/m      Pain Assessment  Patient Currently in Pain: Denies  Primary Pain Location: Knee    Aisha Curtis ATC     "

## 2022-01-04 NOTE — PROGRESS NOTES
Large Joint Injection/Arthocentesis: bilateral knee    Date/Time: 2022 3:12 PM  Performed by: Leo Bright MD  Authorized by: Leo Bright MD     Indications:  Pain and osteoarthritis  Needle Size:  22 G  Guidance: landmark guided    Approach:  Superolateral  Location:  Knee  Laterality:  Bilateral      Medications (Right):  20 mg sodium hyaluronate 20 MG/2ML; 4 mL lidocaine (PF) 1 %  Medications (Left):  20 mg sodium hyaluronate 20 MG/2ML; 4 mL lidocaine (PF) 1 %  Outcome:  Tolerated well, no immediate complications  Procedure discussed: discussed risks, benefits, and alternatives    Consent Given by:  Patient  Timeout: timeout called immediately prior to procedure    Prep: patient was prepped and draped in usual sterile fashion          [facility] [address] [address]  [phone]  Dept: [phone]  ______________________________________________________________________________    Patient: Anson Cifuentes   : 1975   MRN: [id number]   2022    INVASIVE PROCEDURE SAFETY CHECKLIST    Date: 2022   Procedure: Bilateral Euflexxa Injection Knee   Patient Name: Anson Cifuentes  MRN: [id number]  YOB: 1975    Action: Complete sections as appropriate. Any discrepancy results in a HARD COPY until resolved.     PRE PROCEDURE:  Patient ID verified with 2 identifiers (name and  or MRN): Yes  Procedure and site verified with patient/designee (when able): Yes  Accurate consent documentation in medical record: Yes  H&P (or appropriate assessment) documented in medical record: Yes  H&P must be up to 20 days prior to procedure and updates within 24 hours of procedure as applicable: Yes  Relevant diagnostic and radiology test results appropriately labeled and displayed as applicable: Yes  Procedure site(s) marked with provider initials: Yes    TIMEOUT:  Time-Out performed  immediately prior to starting procedure, including verbal and active participation of all team members addressing the following:Yes  * Correct patient identify  * Confirmed that the correct side and site are marked  * An accurate procedure consent form  * Agreement on the procedure to be done  * Correct patient position  * Relevant images and results are properly labeled and appropriately displayed  * The need to administer antibiotics or fluids for irrigation purposes during the procedure as applicable   * Safety precautions based on patient history or medication use    DURING PROCEDURE: Verification of correct person, site, and procedures any time the responsibility for care of the patient is transferred to another member of the care team.       The following medications were given:         Prior to injection, verified patient identity using patient's name and date of birth.  Due to injection administration, patient instructed to remain in clinic for 15 minutes  afterwards, and to report any adverse reaction to me immediately.    Joint injection was performed.    Medication Name: Lidocaine 1%  NDC 79256-388-19  Drug Amount Wasted:  Yes: 12 mg/ml   Vial/Syringe: Multi dose vial  Expiration Date:  07/2025    Medication Name: Euflexxa   NDC [address]-7  Drug Amount Wasted: No  Vial/Syringe: Singe  Expiration Date:  2/12/2023     Medication Name: Euflexxa   NDC [address]-7  Drug Amount Wasted: No  Vial/Syringe: Singe  Expiration Date:  2/12/2023       Scribed by Aisha Curtis ATC  for Dr. Bright on January 4, 2022 at 3:15pm based on the provider's statements to me.     Aisha Curtis ATC

## 2022-01-04 NOTE — LETTER
2022      RE: Anson Cifuentes  [address] [facility] [address]       Large Joint Injection/Arthocentesis: bilateral knee    Date/Time: 2022 3:12 PM  Performed by: Leo Bright MD  Authorized by: Leo Bright MD     Indications:  Pain and osteoarthritis  Needle Size:  22 G  Guidance: landmark guided    Approach:  Superolateral  Location:  Knee  Laterality:  Bilateral      Medications (Right):  20 mg sodium hyaluronate 20 MG/2ML; 4 mL lidocaine (PF) 1 %  Medications (Left):  20 mg sodium hyaluronate 20 MG/2ML; 4 mL lidocaine (PF) 1 %  Outcome:  Tolerated well, no immediate complications  Procedure discussed: discussed risks, benefits, and alternatives    Consent Given by:  Patient  Timeout: timeout called immediately prior to procedure    Prep: patient was prepped and draped in usual sterile fashion          [facility] [address] [address]  [phone]  Dept: [phone]  ______________________________________________________________________________    Patient: Anson Cifuentes   : 1975   MRN: [id number]   2022    INVASIVE PROCEDURE SAFETY CHECKLIST    Date: 2022   Procedure: Bilateral Euflexxa Injection Knee   Patient Name: Anson Cifuentes  MRN: [id number]  YOB: 1975    Action: Complete sections as appropriate. Any discrepancy results in a HARD COPY until resolved.     PRE PROCEDURE:  Patient ID verified with 2 identifiers (name and  or MRN): Yes  Procedure and site verified with patient/designee (when able): Yes  Accurate consent documentation in medical record: Yes  H&P (or appropriate assessment) documented in medical record: Yes  H&P must be up to 20 days prior to procedure and updates within 24 hours of procedure as applicable: Yes  Relevant diagnostic and radiology test results appropriately labeled and displayed as applicable: Yes  Procedure  site(s) marked with provider initials: Yes    TIMEOUT:  Time-Out performed immediately prior to starting procedure, including verbal and active participation of all team members addressing the following:Yes  * Correct patient identify  * Confirmed that the correct side and site are marked  * An accurate procedure consent form  * Agreement on the procedure to be done  * Correct patient position  * Relevant images and results are properly labeled and appropriately displayed  * The need to administer antibiotics or fluids for irrigation purposes during the procedure as applicable   * Safety precautions based on patient history or medication use    DURING PROCEDURE: Verification of correct person, site, and procedures any time the responsibility for care of the patient is transferred to another member of the care team.       The following medications were given:         Prior to injection, verified patient identity using patient's name and date of birth.  Due to injection administration, patient instructed to remain in clinic for 15 minutes  afterwards, and to report any adverse reaction to me immediately.    Joint injection was performed.    Medication Name: Lidocaine 1%  NDC 37264-439-50  Drug Amount Wasted:  Yes: 12 mg/ml   Vial/Syringe: Multi dose vial  Expiration Date:  07/2025    Medication Name: Euflexxa   NDC [address]-0  Drug Amount Wasted: No  Vial/Syringe: Singe  Expiration Date:  2/12/2023     Medication Name: Euflexxa   NDC [address]-7  Drug Amount Wasted: No  Vial/Syringe: Singe  Expiration Date:  2/12/2023       Scribed by Aisha Curtis ATC  for Dr. Bright on January 4, 2022 at 3:15pm based on the provider's statements to me.     Aisha Curtis ATC           Anson returns for bilateral knee injections with euflexxa as planned.    Diagnosis: bilateral knee arthritis    PROCEDURE:       bilateral     Knee joint injection   The patient was apprised of the risks and the benefits of the procedure and  consented and a written consent was signed.   The patient s  KNEEs were sterilely prepped with chloraprep.   The patient was injected with euflexxa  into the R and L knee with a 1.5-inch 22-gauge needle at the_superiorlateral aspect of their knee joint  There were no complications. The patient tolerated the procedure well. There was minimal bleeding.   The patient was instructed to ice the knees upon leaving clinic and refrain from overuse over the next 3 days.   The patient was instructed to go to the emergency room with any usual pain, swelling, or redness occurred in the injected area.   The patient was given a followup appointment to evaluate response to the injection to their increased range of motion and reduction of pain.  Follow up for euflexxa  Dr Leo Bright

## 2022-01-18 ENCOUNTER — TELEPHONE (OUTPATIENT)
Dept: ORTHOPEDICS | Facility: CLINIC | Age: 47
End: 2022-01-18

## 2022-01-18 NOTE — TELEPHONE ENCOUNTER
ATC LVM informing pt that ATC rescheduled Euflexxa injection. Provided call center number if he'd like to rescheduled.     Aisha Curtis ATC      Lianna RENNER

## 2022-01-18 NOTE — TELEPHONE ENCOUNTER
[facility] Call Center    Phone Message    May a detailed message be left on voicemail: yes     Reason for Call Patient cancelled Today Appt because of Back Issues. He want Doctor to call Him about Rescheduling . Patient was looking for Something This Week but nothing available Action Taken: Message routed to:  [facility] ludmila    Travel Screening: Not Applicable

## 2022-02-01 ENCOUNTER — OFFICE VISIT (OUTPATIENT)
Dept: ORTHOPEDICS | Facility: CLINIC | Age: 47
End: 2022-02-01
Payer: COMMERCIAL

## 2022-02-01 VITALS — RESPIRATION RATE: 18 BRPM | HEIGHT: 76 IN | BODY MASS INDEX: 21.31 KG/M2 | WEIGHT: 175 LBS

## 2022-02-01 DIAGNOSIS — M17.0 ARTHRITIS OF BOTH KNEES: Primary | ICD-10-CM

## 2022-02-01 PROCEDURE — 99207 PR DROP WITH A PROCEDURE: CPT | Performed by: PREVENTIVE MEDICINE

## 2022-02-01 PROCEDURE — 20610 DRAIN/INJ JOINT/BURSA W/O US: CPT | Mod: 50 | Performed by: PREVENTIVE MEDICINE

## 2022-02-01 RX ORDER — HYALURONATE SODIUM 10 MG/ML
20 SYRINGE (ML) INTRAARTICULAR
Status: SHIPPED | OUTPATIENT
Start: 2022-02-01

## 2022-02-01 RX ORDER — LIDOCAINE HYDROCHLORIDE 10 MG/ML
4 INJECTION, SOLUTION EPIDURAL; INFILTRATION; INTRACAUDAL; PERINEURAL
Status: SHIPPED | OUTPATIENT
Start: 2022-02-01

## 2022-02-01 RX ADMIN — Medication 20 MG: at 16:37

## 2022-02-01 RX ADMIN — LIDOCAINE HYDROCHLORIDE 4 ML: 10 INJECTION, SOLUTION EPIDURAL; INFILTRATION; INTRACAUDAL; PERINEURAL at 16:37

## 2022-02-01 ASSESSMENT — MIFFLIN-ST. JEOR: SCORE: 1773.8

## 2022-02-01 NOTE — PROGRESS NOTES
Anson returns for bilateral euflexxa injections #2  Doing well  Diagnosis: bilateral knee arthritis    PROCEDURE:     bilateral       Knee joint injection   The patient was apprised of the risks and the benefits of the procedure and consented and a written consent was signed.   The patient s  KNEEs were sterilely prepped with chloraprep.   The patient was injected with euflexxa syringe into the right and left knee with a 1.5-inch 22-gauge needle at the_superiorlateral aspect of their knee joint  There were no complications. The patient tolerated the procedure well. There was minimal bleeding.   The patient was instructed to ice the knees upon leaving clinic and refrain from overuse over the next 3 days.   The patient was instructed to go to the emergency room with any usual pain, swelling, or redness occurred in the injected area.   The patient was given a followup appointment to evaluate response to the injection to their increased range of motion and reduction of pain.  Follow up for euflexxa #3  Dr Leo Bright

## 2022-02-01 NOTE — NURSING NOTE
"Reason For Visit:   Chief Complaint   Patient presents with     Procedure     euflexxa 2 of 3       Resp 18   Ht 1.92 m (6' 3.59\")   Wt 79.4 kg (175 lb)   BMI 21.53 kg/m      Pain Assessment  Patient Currently in Pain: Denies  Primary Pain Location: Knee    Aisha Curtis ATC     "

## 2022-02-01 NOTE — LETTER
2022      [facility] [address]       Large Joint Injection/Arthocentesis: bilateral knee    Date/Time: 2022 4:37 PM  Performed by: Leo Bright MD  Authorized by: Leo Bright MD     Indications:  Pain and osteoarthritis  Needle Size:  22 G  Guidance: landmark guided    Approach:  Superolateral  Location:  Knee  Laterality:  Bilateral      Medications (Right):  20 mg sodium hyaluronate 20 MG/2ML; 4 mL lidocaine (PF) 1 %  Medications (Left):  20 mg sodium hyaluronate 20 MG/2ML; 4 mL lidocaine (PF) 1 %  Outcome:  Tolerated well, no immediate complications  Procedure discussed: discussed risks, benefits, and alternatives    Consent Given by:  Patient  Timeout: timeout called immediately prior to procedure    Prep: patient was prepped and draped in usual sterile fashion          [facility] [address] [address]  [phone]  Dept: [phone]  ______________________________________________________________________________    Patient: Anson Cifuentes   : 1975   MRN: [id number]   2022    INVASIVE PROCEDURE SAFETY CHECKLIST    Date: 2022   Procedure: Bilateral Knee Euflexxa Injection   Patient Name: Anson Cifuentes  MRN: [id number]  YOB: 1975    Action: Complete sections as appropriate. Any discrepancy results in a HARD COPY until resolved.     PRE PROCEDURE:  Patient ID verified with 2 identifiers (name and  or MRN): Yes  Procedure and site verified with patient/designee (when able): Yes  Accurate consent documentation in medical record: Yes  H&P (or appropriate assessment) documented in medical record: Yes  H&P must be up to 20 days prior to procedure and updates within 24 hours of procedure as applicable: Yes  Relevant diagnostic and radiology test results appropriately labeled and displayed as applicable: Yes  Procedure  site(s) marked with provider initials: Yes    TIMEOUT:  Time-Out performed immediately prior to starting procedure, including verbal and active participation of all team members addressing the following:Yes  * Correct patient identify  * Confirmed that the correct side and site are marked  * An accurate procedure consent form  * Agreement on the procedure to be done  * Correct patient position  * Relevant images and results are properly labeled and appropriately displayed  * The need to administer antibiotics or fluids for irrigation purposes during the procedure as applicable   * Safety precautions based on patient history or medication use    DURING PROCEDURE: Verification of correct person, site, and procedures any time the responsibility for care of the patient is transferred to another member of the care team.       The following medications were given:         Prior to injection, verified patient identity using patient's name and date of birth.  Due to injection administration, patient instructed to remain in clinic for 15 minutes  afterwards, and to report any adverse reaction to me immediately.    Joint injection was performed.    Medication Name: Euflexxa    NDC [address]-4  Drug Amount Wasted:  None.  Vial/Syringe: Syringe  Expiration Date:  02/12/2023    Medication Name: Euflexxa    NDC [address]-3  Drug Amount Wasted:  None.  Vial/Syringe: Syringe  Expiration Date:  02/12/2023    Medication Name: Lidocaine 1%   NDC 80932-618-95  Drug Amount Wasted:  Yes: 10 mg/ml   Vial/Syringe: Single dose vial  Expiration Date:  07/2025      Scribed by Aisha Curtis ATC for Dr. Bright on February 1, 2022 at 4:35pm based on the provider's statements to me.     Aisha Curtis ATC               Anson returns for bilateral euflexxa injections #2  Doing well  Diagnosis: bilateral knee arthritis    PROCEDURE:     bilateral       Knee joint injection   The patient was apprised of the risks and the benefits of the  procedure and consented and a written consent was signed.   The patient s  KNEEs were sterilely prepped with chloraprep.   The patient was injected with euflexxa syringe into the right and left knee with a 1.5-inch 22-gauge needle at the_superiorlateral aspect of their knee joint  There were no complications. The patient tolerated the procedure well. There was minimal bleeding.   The patient was instructed to ice the knees upon leaving clinic and refrain from overuse over the next 3 days.   The patient was instructed to go to the emergency room with any usual pain, swelling, or redness occurred in the injected area.   The patient was given a followup appointment to evaluate response to the injection to their increased range of motion and reduction of pain.  Follow up for euflexxa #3  Dr Leo Bright MD

## 2022-02-01 NOTE — PROGRESS NOTES
Large Joint Injection/Arthocentesis: bilateral knee    Date/Time: 2022 4:37 PM  Performed by: Leo Bright MD  Authorized by: Leo Bright MD     Indications:  Pain and osteoarthritis  Needle Size:  22 G  Guidance: landmark guided    Approach:  Superolateral  Location:  Knee  Laterality:  Bilateral      Medications (Right):  20 mg sodium hyaluronate 20 MG/2ML; 4 mL lidocaine (PF) 1 %  Medications (Left):  20 mg sodium hyaluronate 20 MG/2ML; 4 mL lidocaine (PF) 1 %  Outcome:  Tolerated well, no immediate complications  Procedure discussed: discussed risks, benefits, and alternatives    Consent Given by:  Patient  Timeout: timeout called immediately prior to procedure    Prep: patient was prepped and draped in usual sterile fashion          [facility] [address] [address]  [phone]  Dept: [phone]  ______________________________________________________________________________    Patient: Anson Cifuentes   : 1975   MRN: [id number]   2022    INVASIVE PROCEDURE SAFETY CHECKLIST    Date: 2022   Procedure: Bilateral Knee Euflexxa Injection   Patient Name: Anson Cifuentes  MRN: [id number]  YOB: 1975    Action: Complete sections as appropriate. Any discrepancy results in a HARD COPY until resolved.     PRE PROCEDURE:  Patient ID verified with 2 identifiers (name and  or MRN): Yes  Procedure and site verified with patient/designee (when able): Yes  Accurate consent documentation in medical record: Yes  H&P (or appropriate assessment) documented in medical record: Yes  H&P must be up to 20 days prior to procedure and updates within 24 hours of procedure as applicable: Yes  Relevant diagnostic and radiology test results appropriately labeled and displayed as applicable: Yes  Procedure site(s) marked with provider initials: Yes    TIMEOUT:  Time-Out performed  immediately prior to starting procedure, including verbal and active participation of all team members addressing the following:Yes  * Correct patient identify  * Confirmed that the correct side and site are marked  * An accurate procedure consent form  * Agreement on the procedure to be done  * Correct patient position  * Relevant images and results are properly labeled and appropriately displayed  * The need to administer antibiotics or fluids for irrigation purposes during the procedure as applicable   * Safety precautions based on patient history or medication use    DURING PROCEDURE: Verification of correct person, site, and procedures any time the responsibility for care of the patient is transferred to another member of the care team.       The following medications were given:         Prior to injection, verified patient identity using patient's name and date of birth.  Due to injection administration, patient instructed to remain in clinic for 15 minutes  afterwards, and to report any adverse reaction to me immediately.    Joint injection was performed.    Medication Name: Euflexxa    NDC [address]-3  Drug Amount Wasted:  None.  Vial/Syringe: Syringe  Expiration Date:  02/12/2023    Medication Name: Euflexxa    NDC [address]-9  Drug Amount Wasted:  None.  Vial/Syringe: Syringe  Expiration Date:  02/12/2023    Medication Name: Lidocaine 1%   NDC 57077-853-78  Drug Amount Wasted:  Yes: 10 mg/ml   Vial/Syringe: Single dose vial  Expiration Date:  07/2025      Scribed by Aisha Curtis ATC for Dr. Bright on February 1, 2022 at 4:35pm based on the provider's statements to me.     Aisha Curtis ATC

## 2022-02-03 DIAGNOSIS — G89.29 CHRONIC PAIN OF BOTH KNEES: ICD-10-CM

## 2022-02-03 DIAGNOSIS — M17.10 PATELLOFEMORAL ARTHRITIS: ICD-10-CM

## 2022-02-03 DIAGNOSIS — M25.561 CHRONIC PAIN OF BOTH KNEES: ICD-10-CM

## 2022-02-03 DIAGNOSIS — M25.562 CHRONIC PAIN OF BOTH KNEES: ICD-10-CM

## 2022-02-04 RX ORDER — CELECOXIB 100 MG/1
100 CAPSULE ORAL 2 TIMES DAILY PRN
Qty: 60 CAPSULE | Refills: 0 | Status: SHIPPED | OUTPATIENT
Start: 2022-02-04

## 2022-02-07 ENCOUNTER — TELEPHONE (OUTPATIENT)
Dept: ORTHOPEDICS | Facility: CLINIC | Age: 47
End: 2022-02-07
Payer: COMMERCIAL

## 2022-02-07 NOTE — TELEPHONE ENCOUNTER
[facility]    Phone Message    May a detailed message be left on voicemail: yes     Reason for Call: Other: Patient wants Dr. Bright himself calling him back -- he cancelled the appt on 2/8 and wanted to r/s for next week but i told him Kaila is going to be out and will not have anything open - he insited on speaking with Kaila himself to reschedule this appt      Action Taken: Message routed to:  [facility] ([facility] sports    Travel Screening: Not Applicable

## 2022-02-07 NOTE — TELEPHONE ENCOUNTER
ATC called and spoke to pt. ATC reschedule last Euflexxa injection. Pt confirmed appt date/time. Stated he'd call back if the appt date didn't work. Pt confirmed clinic number. Pt can be scheduled in any 20 min slot with Dr Bright.     Aisha Curtis ATC

## 2022-02-11 ENCOUNTER — OFFICE VISIT (OUTPATIENT)
Dept: ORTHOPEDICS | Facility: CLINIC | Age: 47
End: 2022-02-11
Payer: COMMERCIAL

## 2022-02-11 VITALS — RESPIRATION RATE: 17 BRPM | HEIGHT: 76 IN | BODY MASS INDEX: 21.31 KG/M2 | WEIGHT: 175 LBS

## 2022-02-11 DIAGNOSIS — M17.0 ARTHRITIS OF BOTH KNEES: Primary | ICD-10-CM

## 2022-02-11 PROCEDURE — 99207 PR DROP WITH A PROCEDURE: CPT | Performed by: PREVENTIVE MEDICINE

## 2022-02-11 PROCEDURE — 20610 DRAIN/INJ JOINT/BURSA W/O US: CPT | Mod: 50 | Performed by: PREVENTIVE MEDICINE

## 2022-02-11 RX ORDER — HYALURONATE SODIUM 10 MG/ML
20 SYRINGE (ML) INTRAARTICULAR
Status: SHIPPED | OUTPATIENT
Start: 2022-02-11

## 2022-02-11 RX ORDER — LIDOCAINE HYDROCHLORIDE 10 MG/ML
4 INJECTION, SOLUTION EPIDURAL; INFILTRATION; INTRACAUDAL; PERINEURAL
Status: SHIPPED | OUTPATIENT
Start: 2022-02-11

## 2022-02-11 RX ADMIN — Medication 20 MG: at 13:07

## 2022-02-11 RX ADMIN — LIDOCAINE HYDROCHLORIDE 4 ML: 10 INJECTION, SOLUTION EPIDURAL; INFILTRATION; INTRACAUDAL; PERINEURAL at 13:07

## 2022-02-11 ASSESSMENT — MIFFLIN-ST. JEOR: SCORE: 1773.8

## 2022-02-11 NOTE — NURSING NOTE
"Reason For Visit:   Chief Complaint   Patient presents with     Procedure     Euflexxa 3 of 3        Resp 17   Ht 1.92 m (6' 3.59\")   Wt 79.4 kg (175 lb)   BMI 21.53 kg/m      Pain Assessment  Patient Currently in Pain: Denies  Primary Pain Location: Knee    Aisha Curtis ATC     "

## 2022-02-11 NOTE — LETTER
2022      [facility]: Anson Cifuentes  [address] [facility] [address]       Large Joint Injection/Arthocentesis: bilateral knee    Date/Time: 2022 1:07 PM  Performed by: Leo Bright MD  Authorized by: Leo Bright MD     Indications:  Pain and osteoarthritis  Needle Size:  22 G  Guidance: landmark guided    Approach:  Superolateral  Location:  Knee  Laterality:  Bilateral      Medications (Right):  20 mg sodium hyaluronate 20 MG/2ML; 4 mL lidocaine (PF) 1 %  Medications (Left):  20 mg sodium hyaluronate 20 MG/2ML; 4 mL lidocaine (PF) 1 %  Outcome:  Tolerated well, no immediate complications  Procedure discussed: discussed risks, benefits, and alternatives    Consent Given by:  Patient  Timeout: timeout called immediately prior to procedure    Prep: patient was prepped and draped in usual sterile fashion            [facility] [address] [address]  [phone]  Dept: [phone]  ______________________________________________________________________________    Patient: Anson Cifuentes   : 1975   MRN: [id number]   2022    INVASIVE PROCEDURE SAFETY CHECKLIST    Date: 2022   Procedure: Bilateral knee euflexxa injections   Patient Name: Anson Cifuentes  MRN: [id number]  YOB: 1975    Action: Complete sections as appropriate. Any discrepancy results in a HARD COPY until resolved.     PRE PROCEDURE:  Patient ID verified with 2 identifiers (name and  or MRN): Yes  Procedure and site verified with patient/designee (when able): Yes  Accurate consent documentation in medical record: Yes  H&P (or appropriate assessment) documented in medical record: Yes  H&P must be up to 20 days prior to procedure and updates within 24 hours of procedure as applicable: Yes  Relevant diagnostic and radiology test results appropriately labeled and displayed as applicable:  Yes  Procedure site(s) marked with provider initials: Yes    TIMEOUT:  Time-Out performed immediately prior to starting procedure, including verbal and active participation of all team members addressing the following:Yes  * Correct patient identify  * Confirmed that the correct side and site are marked  * An accurate procedure consent form  * Agreement on the procedure to be done  * Correct patient position  * Relevant images and results are properly labeled and appropriately displayed  * The need to administer antibiotics or fluids for irrigation purposes during the procedure as applicable   * Safety precautions based on patient history or medication use    DURING PROCEDURE: Verification of correct person, site, and procedures any time the responsibility for care of the patient is transferred to another member of the care team.       The following medications were given:         Prior to injection, verified patient identity using patient's name and date of birth.  Due to injection administration, patient instructed to remain in clinic for 15 minutes  afterwards, and to report any adverse reaction to me immediately.    Joint injection was performed.    Medication Name: Euflexxa    NDC [address]-2  Drug Amount Wasted:  None.  Vial/Syringe: Syringe  Expiration Date:  3/13/2023    Medication Name: Euflexxa    NDC [address]-4  Drug Amount Wasted:  None.  Vial/Syringe: Syringe  Expiration Date:  3/13/2023    Medication Name: Lidocaine 1%  NDC 24550-908-37  Drug Amount Wasted:  Yes: 10 mg/ml   Vial/Syringe: Single dose vial  Expiration Date:  07/2025      Scribed by Aisha Curtis ATC  for Dr. Bright on February 11, 2022 at 1:10pm based on the provider's statements to me.     Aisha Curtis ATC         Anson returns for bilateral euflexxa injections #3  Doing well  Diagnosis: bilateral knee arthritis    PROCEDURE:     bilateral       Knee joint injection   The patient was apprised of the risks and the benefits of  the procedure and consented and a written consent was signed.   The patient s  KNEEs were sterilely prepped with chloraprep.   The patient was injected with euflexxa syringe into the right and left knee with a 1.5-inch 22-gauge needle at the_superiorlateral aspect of their knee joint  There were no complications. The patient tolerated the procedure well. There was minimal bleeding.   The patient was instructed to ice the knees upon leaving clinic and refrain from overuse over the next 3 days.   The patient was instructed to go to the emergency room with any usual pain, swelling, or redness occurred in the injected area.   The patient was given a followup appointment to evaluate response to the injection to their increased range of motion and reduction of pain.  Follow up in 6 months  Dr Leo Bright

## 2022-02-11 NOTE — PROGRESS NOTES
Large Joint Injection/Arthocentesis: bilateral knee    Date/Time: 2022 1:07 PM  Performed by: Leo Bright MD  Authorized by: Leo Bright MD     Indications:  Pain and osteoarthritis  Needle Size:  22 G  Guidance: landmark guided    Approach:  Superolateral  Location:  Knee  Laterality:  Bilateral      Medications (Right):  20 mg sodium hyaluronate 20 MG/2ML; 4 mL lidocaine (PF) 1 %  Medications (Left):  20 mg sodium hyaluronate 20 MG/2ML; 4 mL lidocaine (PF) 1 %  Outcome:  Tolerated well, no immediate complications  Procedure discussed: discussed risks, benefits, and alternatives    Consent Given by:  Patient  Timeout: timeout called immediately prior to procedure    Prep: patient was prepped and draped in usual sterile fashion            [facility] [address] [address]  [phone]  Dept: [phone]  ______________________________________________________________________________    Patient: Anson Cifuentes   : 1975   MRN: [id number]   2022    INVASIVE PROCEDURE SAFETY CHECKLIST    Date: 2022   Procedure: Bilateral knee euflexxa injections   Patient Name: Anson Cifuentes  MRN: [id number]  YOB: 1975    Action: Complete sections as appropriate. Any discrepancy results in a HARD COPY until resolved.     PRE PROCEDURE:  Patient ID verified with 2 identifiers (name and  or MRN): Yes  Procedure and site verified with patient/designee (when able): Yes  Accurate consent documentation in medical record: Yes  H&P (or appropriate assessment) documented in medical record: Yes  H&P must be up to 20 days prior to procedure and updates within 24 hours of procedure as applicable: Yes  Relevant diagnostic and radiology test results appropriately labeled and displayed as applicable: Yes  Procedure site(s) marked with provider initials: Yes    TIMEOUT:  Time-Out performed  immediately prior to starting procedure, including verbal and active participation of all team members addressing the following:Yes  * Correct patient identify  * Confirmed that the correct side and site are marked  * An accurate procedure consent form  * Agreement on the procedure to be done  * Correct patient position  * Relevant images and results are properly labeled and appropriately displayed  * The need to administer antibiotics or fluids for irrigation purposes during the procedure as applicable   * Safety precautions based on patient history or medication use    DURING PROCEDURE: Verification of correct person, site, and procedures any time the responsibility for care of the patient is transferred to another member of the care team.       The following medications were given:         Prior to injection, verified patient identity using patient's name and date of birth.  Due to injection administration, patient instructed to remain in clinic for 15 minutes  afterwards, and to report any adverse reaction to me immediately.    Joint injection was performed.    Medication Name: Euflexxa    NDC [address]-8  Drug Amount Wasted:  None.  Vial/Syringe: Syringe  Expiration Date:  3/13/2023    Medication Name: Euflexxa    NDC [address]-3  Drug Amount Wasted:  None.  Vial/Syringe: Syringe  Expiration Date:  3/13/2023    Medication Name: Lidocaine 1%  NDC 18086-203-64  Drug Amount Wasted:  Yes: 10 mg/ml   Vial/Syringe: Single dose vial  Expiration Date:  07/2025      Scribed by Aisha Curtis ATC  for Dr. Bright on February 11, 2022 at 1:10pm based on the provider's statements to me.     Asiha Curtis ATC

## 2022-06-01 ENCOUNTER — OFFICE VISIT (OUTPATIENT)
Dept: FAMILY MEDICINE | Facility: CLINIC | Age: 47
End: 2022-06-01
Payer: COMMERCIAL

## 2022-06-01 VITALS
WEIGHT: 179 LBS | SYSTOLIC BLOOD PRESSURE: 110 MMHG | DIASTOLIC BLOOD PRESSURE: 71 MMHG | HEIGHT: 75 IN | BODY MASS INDEX: 22.26 KG/M2 | HEART RATE: 60 BPM | RESPIRATION RATE: 16 BRPM | TEMPERATURE: 98.2 F

## 2022-06-01 DIAGNOSIS — J30.2 SEASONAL ALLERGIC RHINITIS, UNSPECIFIED TRIGGER: ICD-10-CM

## 2022-06-01 DIAGNOSIS — Z00.00 VISIT FOR PREVENTIVE HEALTH EXAMINATION: Primary | ICD-10-CM

## 2022-06-01 DIAGNOSIS — Z13.228 SCREENING FOR ENDOCRINE, NUTRITIONAL, METABOLIC AND IMMUNITY DISORDER: ICD-10-CM

## 2022-06-01 DIAGNOSIS — Z13.21 SCREENING FOR ENDOCRINE, NUTRITIONAL, METABOLIC AND IMMUNITY DISORDER: ICD-10-CM

## 2022-06-01 DIAGNOSIS — F41.0 PANIC ATTACK: ICD-10-CM

## 2022-06-01 DIAGNOSIS — Z12.11 SCREEN FOR COLON CANCER: ICD-10-CM

## 2022-06-01 DIAGNOSIS — R74.8 ELEVATED ALKALINE PHOSPHATASE LEVEL: ICD-10-CM

## 2022-06-01 DIAGNOSIS — Z11.59 NEED FOR HEPATITIS C SCREENING TEST: ICD-10-CM

## 2022-06-01 DIAGNOSIS — Z13.0 SCREENING FOR ENDOCRINE, NUTRITIONAL, METABOLIC AND IMMUNITY DISORDER: ICD-10-CM

## 2022-06-01 DIAGNOSIS — Z13.29 SCREENING FOR ENDOCRINE, NUTRITIONAL, METABOLIC AND IMMUNITY DISORDER: ICD-10-CM

## 2022-06-01 LAB
ALBUMIN SERPL-MCNC: 3.6 G/DL (ref 3.5–5)
ALP SERPL-CCNC: 126 U/L (ref 45–120)
ALT SERPL W P-5'-P-CCNC: 22 U/L (ref 0–45)
ANION GAP SERPL CALCULATED.3IONS-SCNC: 8 MMOL/L (ref 5–18)
AST SERPL W P-5'-P-CCNC: 24 U/L (ref 0–40)
BILIRUB SERPL-MCNC: 0.3 MG/DL (ref 0–1)
BUN SERPL-MCNC: 21 MG/DL (ref 8–22)
CALCIUM SERPL-MCNC: 9.1 MG/DL (ref 8.5–10.5)
CHLORIDE BLD-SCNC: 107 MMOL/L (ref 98–107)
CHOLEST SERPL-MCNC: 170 MG/DL
CO2 SERPL-SCNC: 27 MMOL/L (ref 22–31)
CREAT SERPL-MCNC: 0.86 MG/DL (ref 0.7–1.3)
FASTING STATUS PATIENT QL REPORTED: YES
GFR SERPL CREATININE-BSD FRML MDRD: >90 ML/MIN/1.73M2
GLUCOSE BLD-MCNC: 95 MG/DL (ref 70–125)
HCV AB SERPL QL IA: NONREACTIVE
HDLC SERPL-MCNC: 72 MG/DL
LDLC SERPL CALC-MCNC: 88 MG/DL
POTASSIUM BLD-SCNC: 4.8 MMOL/L (ref 3.5–5)
PROT SERPL-MCNC: 6.2 G/DL (ref 6–8)
SODIUM SERPL-SCNC: 142 MMOL/L (ref 136–145)
TRIGL SERPL-MCNC: 48 MG/DL
TSH SERPL DL<=0.005 MIU/L-ACNC: 1.82 UIU/ML (ref 0.3–5)

## 2022-06-01 PROCEDURE — 84080 ASSAY ALKALINE PHOSPHATASES: CPT | Mod: 90 | Performed by: FAMILY MEDICINE

## 2022-06-01 PROCEDURE — 36415 COLL VENOUS BLD VENIPUNCTURE: CPT | Performed by: FAMILY MEDICINE

## 2022-06-01 PROCEDURE — 86803 HEPATITIS C AB TEST: CPT | Performed by: FAMILY MEDICINE

## 2022-06-01 PROCEDURE — 84443 ASSAY THYROID STIM HORMONE: CPT | Performed by: FAMILY MEDICINE

## 2022-06-01 PROCEDURE — 99396 PREV VISIT EST AGE 40-64: CPT | Performed by: FAMILY MEDICINE

## 2022-06-01 PROCEDURE — 80053 COMPREHEN METABOLIC PANEL: CPT | Mod: 90 | Performed by: FAMILY MEDICINE

## 2022-06-01 PROCEDURE — 80061 LIPID PANEL: CPT | Performed by: FAMILY MEDICINE

## 2022-06-01 PROCEDURE — 99000 SPECIMEN HANDLING OFFICE-LAB: CPT | Performed by: FAMILY MEDICINE

## 2022-06-01 RX ORDER — OLOPATADINE HYDROCHLORIDE 1 MG/ML
1 SOLUTION/ DROPS OPHTHALMIC 2 TIMES DAILY
Qty: 5 ML | Refills: 3 | Status: SHIPPED | OUTPATIENT
Start: 2022-06-01

## 2022-06-01 RX ORDER — ALPRAZOLAM 2 MG
TABLET ORAL
Qty: 30 TABLET | Refills: 1 | Status: SHIPPED | OUTPATIENT
Start: 2022-06-01

## 2022-06-01 RX ORDER — FLUTICASONE PROPIONATE 50 MCG
1 SPRAY, SUSPENSION (ML) NASAL DAILY
Qty: 9.9 ML | Refills: 3 | Status: SHIPPED | OUTPATIENT
Start: 2022-06-01 | End: 2022-07-28

## 2022-06-01 RX ORDER — GABAPENTIN 600 MG/1
900 TABLET ORAL 3 TIMES DAILY
Qty: 135 TABLET | Refills: 4 | Status: SHIPPED | OUTPATIENT
Start: 2022-06-01 | End: 2023-01-05

## 2022-06-01 ASSESSMENT — ENCOUNTER SYMPTOMS
HEADACHES: 0
NERVOUS/ANXIOUS: 0
DIARRHEA: 0
SORE THROAT: 0
JOINT SWELLING: 0
CONSTIPATION: 0
FEVER: 0
COUGH: 0
ARTHRALGIAS: 1
PARESTHESIAS: 0
FREQUENCY: 0
HEMATURIA: 0
CHILLS: 0
PALPITATIONS: 0
EYE PAIN: 0
ABDOMINAL PAIN: 0
DIZZINESS: 0
WEAKNESS: 0
NAUSEA: 0
MYALGIAS: 0
HEMATOCHEZIA: 0
HEARTBURN: 1
DYSURIA: 0
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
## 2022-06-07 LAB
ALP BONE SERPL-CCNC: 68 U/L
ALP LIVER SERPL-CCNC: 68 U/L
ALP OTHER SERPL-CCNC: 0 U/L
ALP SERPL-CCNC: 136 U/L

## 2022-06-08 ENCOUNTER — MYC REFILL (OUTPATIENT)
Dept: FAMILY MEDICINE | Facility: CLINIC | Age: 47
End: 2022-06-08
Payer: COMMERCIAL

## 2022-06-08 DIAGNOSIS — G47.00 INSOMNIA: ICD-10-CM

## 2022-06-08 RX ORDER — TRAZODONE HYDROCHLORIDE 150 MG/1
150 TABLET ORAL AT BEDTIME
Qty: 180 TABLET | Refills: 1 | Status: SHIPPED | OUTPATIENT
Start: 2022-06-08 | End: 2023-05-08

## 2022-06-09 NOTE — TELEPHONE ENCOUNTER
"Last Written Prescription Date:  5/27/21  Last Fill Quantity: 180,  # refills: 1   Last office visit provider:  6/1/22     Requested Prescriptions   Pending Prescriptions Disp Refills     traZODone (DESYREL) 150 MG tablet 180 tablet 1       Serotonin Modulators Passed - 6/8/2022  7:11 AM        Passed - Recent (12 mo) or future (30 days) visit within the authorizing provider's specialty     Patient has had an office visit with the authorizing provider or a provider within the authorizing providers department within the previous 12 mos or has a future within next 30 days. See \"Patient Info\" tab in inbasket, or \"Choose Columns\" in Meds & Orders section of the refill encounter.              Passed - Medication is active on med list        Passed - Patient is age 18 or older             Suzanne Starr RN 06/08/22 8:18 PM  "

## 2022-06-22 DIAGNOSIS — F43.10 PTSD (POST-TRAUMATIC STRESS DISORDER): ICD-10-CM

## 2022-06-22 RX ORDER — PRAZOSIN HYDROCHLORIDE 5 MG/1
10 CAPSULE ORAL AT BEDTIME
Qty: 180 CAPSULE | Refills: 3 | Status: SHIPPED | OUTPATIENT
Start: 2022-06-22 | End: 2022-10-18

## 2022-06-22 NOTE — TELEPHONE ENCOUNTER
"Last Written Prescription Date:  8/16/21  Last Fill Quantity: 180,  # refills: 3   Last office visit provider:  6/1/22     Requested Prescriptions   Pending Prescriptions Disp Refills     prazosin (MINIPRESS) 5 MG capsule 180 capsule 3     Sig: Take 2 capsules (10 mg) by mouth At Bedtime       Alpha Blockers Passed - 6/22/2022  8:05 AM        Passed - Blood pressure under 140/90 in past 12 months     BP Readings from Last 3 Encounters:   06/01/22 110/71   07/21/21 100/72   10/26/20 104/66                 Passed - Recent (12 mo) or future (30 days) visit within the authorizing provider's specialty     Patient has had an office visit with the authorizing provider or a provider within the authorizing providers department within the previous 12 mos or has a future within next 30 days. See \"Patient Info\" tab in inbasket, or \"Choose Columns\" in Meds & Orders section of the refill encounter.              Passed - Patient does not have Tadalafil, Vardenafil, or Sildenafil on their medication list        Passed - Medication is active on med list        Passed - Patient is 18 years of age or older       Antiadrenergic Antihypertensives Passed - 6/22/2022  8:05 AM        Passed - Blood pressure less than 140/90 in past 6 months     BP Readings from Last 3 Encounters:   06/01/22 110/71   07/21/21 100/72   10/26/20 104/66                 Passed - Medication is active on med list        Passed - Patient is age 18 or older        Passed - Normal serum creatinine on file in past 12 months     Recent Labs   Lab Test 06/01/22  0953   CR 0.86       Ok to refill medication if creatinine is low          Passed - Recent (6 mo) or future (30 days) visit within the authorizing provider's specialty     Patient had office visit in the last 6 months or has a visit in the next 30 days with authorizing provider or within the authorizing provider's specialty.  See \"Patient Info\" tab in inbasket, or \"Choose Columns\" in Meds & Orders section of " "the refill encounter.               FLUoxetine (PROZAC) 20 MG capsule 270 capsule 1     Sig: Take 3 capsules (60 mg) by mouth daily       SSRIs Protocol Passed - 6/22/2022  8:05 AM        Passed - Recent (12 mo) or future (30 days) visit within the authorizing provider's specialty     Patient has had an office visit with the authorizing provider or a provider within the authorizing providers department within the previous 12 mos or has a future within next 30 days. See \"Patient Info\" tab in inbasket, or \"Choose Columns\" in Meds & Orders section of the refill encounter.              Passed - Medication is active on med list        Passed - Patient is age 18 or older             Suzanne Starr RN 06/22/22 6:41 PM  "

## 2022-07-11 DIAGNOSIS — G47.00 INSOMNIA, UNSPECIFIED TYPE: ICD-10-CM

## 2022-07-11 DIAGNOSIS — F51.5 NIGHTMARE: ICD-10-CM

## 2022-07-11 DIAGNOSIS — F43.10 PTSD (POST-TRAUMATIC STRESS DISORDER): Primary | ICD-10-CM

## 2022-07-11 NOTE — TELEPHONE ENCOUNTER
Patient requests new RX, would like 2mg. Patient wants to stay on lower dose of 4mg (2pills) daily.

## 2022-07-12 RX ORDER — PRAZOSIN HYDROCHLORIDE 2 MG/1
2 CAPSULE ORAL AT BEDTIME
Qty: 60 CAPSULE | Refills: 1 | Status: SHIPPED | OUTPATIENT
Start: 2022-07-12 | End: 2022-08-29

## 2022-07-28 DIAGNOSIS — J30.2 SEASONAL ALLERGIC RHINITIS, UNSPECIFIED TRIGGER: ICD-10-CM

## 2022-07-28 RX ORDER — FLUTICASONE PROPIONATE 50 MCG
SPRAY, SUSPENSION (ML) NASAL
Qty: 16 ML | Refills: 8 | Status: SHIPPED | OUTPATIENT
Start: 2022-07-28

## 2022-07-28 NOTE — TELEPHONE ENCOUNTER
"Last Written Prescription Date:  6/1/22  Last Fill Quantity: 9.9,  # refills: 3   Last office visit provider:  6/1/22     Requested Prescriptions   Pending Prescriptions Disp Refills     fluticasone (FLONASE) 50 MCG/ACT nasal spray [Pharmacy Med Name: FLUTICASONE PROP 50 MCG SPRAY] 16 mL 3     Sig: INSTILL 1 SPRAY INTO BOTH NOSTRILS DAILY       Nasal Allergy Protocol Passed - 7/28/2022 11:32 AM        Passed - Patient is age 12 or older        Passed - Recent (12 mo) or future (30 days) visit within the authorizing provider's specialty     Patient has had an office visit with the authorizing provider or a provider within the authorizing providers department within the previous 12 mos or has a future within next 30 days. See \"Patient Info\" tab in inbasket, or \"Choose Columns\" in Meds & Orders section of the refill encounter.              Passed - Medication is active on med list             Suzanne Starr RN 07/28/22 3:46 PM  "

## 2022-07-31 ENCOUNTER — TRANSFERRED RECORDS (OUTPATIENT)
Dept: HEALTH INFORMATION MANAGEMENT | Facility: CLINIC | Age: 47
End: 2022-07-31

## 2022-08-29 DIAGNOSIS — F43.10 PTSD (POST-TRAUMATIC STRESS DISORDER): ICD-10-CM

## 2022-08-29 DIAGNOSIS — F51.5 NIGHTMARE: ICD-10-CM

## 2022-08-29 RX ORDER — PRAZOSIN HYDROCHLORIDE 2 MG/1
CAPSULE ORAL
Qty: 90 CAPSULE | Refills: 0 | Status: SHIPPED | OUTPATIENT
Start: 2022-08-29 | End: 2022-10-18

## 2022-08-30 NOTE — TELEPHONE ENCOUNTER
"Last Written Prescription Date:  7/12/22  Last Fill Quantity: 60,  # refills: 1   Last office visit provider:  6/1/22     Requested Prescriptions   Pending Prescriptions Disp Refills     prazosin (MINIPRESS) 2 MG capsule [Pharmacy Med Name: PRAZOSIN 2 MG CAPSULE] 90 capsule 1     Sig: TAKE 1 CAPSULE BY MOUTH AT BEDTIME       Alpha Blockers Passed - 8/29/2022 12:33 PM        Passed - Blood pressure under 140/90 in past 12 months     BP Readings from Last 3 Encounters:   06/01/22 110/71   07/21/21 100/72   10/26/20 104/66                 Passed - Recent (12 mo) or future (30 days) visit within the authorizing provider's specialty     Patient has had an office visit with the authorizing provider or a provider within the authorizing providers department within the previous 12 mos or has a future within next 30 days. See \"Patient Info\" tab in [employer], or \"Choose Columns\" in Meds & Orders section of the refill encounter.              Passed - Patient does not have Tadalafil, Vardenafil, or Sildenafil on their medication list        Passed - Medication is active on med list        Passed - Patient is 18 years of age or older       Antiadrenergic Antihypertensives Passed - 8/29/2022 12:33 PM        Passed - Blood pressure less than 140/90 in past 6 months     BP Readings from Last 3 Encounters:   06/01/22 110/71   07/21/21 100/72   10/26/20 104/66                 Passed - Medication is active on med list        Passed - Patient is age 18 or older        Passed - Normal serum creatinine on file in past 12 months     Recent Labs   Lab Test 06/01/22  0953   CR 0.86       Ok to refill medication if creatinine is low          Passed - Recent (6 mo) or future (30 days) visit within the authorizing provider's specialty     Patient had office visit in the last 6 months or has a visit in the next 30 days with authorizing provider or within the authorizing provider's specialty.  See \"Patient Info\" tab in [employer], or \"Choose " "Columns\" in Meds & Orders section of the refill encounter.                 Suzanne Starr RN 08/29/22 7:53 PM  "

## 2022-10-12 ENCOUNTER — MYC MEDICAL ADVICE (OUTPATIENT)
Dept: FAMILY MEDICINE | Facility: CLINIC | Age: 47
End: 2022-10-12

## 2022-10-12 DIAGNOSIS — F51.5 NIGHTMARE: ICD-10-CM

## 2022-10-12 DIAGNOSIS — F43.10 PTSD (POST-TRAUMATIC STRESS DISORDER): ICD-10-CM

## 2022-10-14 DIAGNOSIS — F51.5 NIGHTMARE: ICD-10-CM

## 2022-10-14 DIAGNOSIS — F43.10 PTSD (POST-TRAUMATIC STRESS DISORDER): ICD-10-CM

## 2022-10-14 RX ORDER — PRAZOSIN HYDROCHLORIDE 5 MG/1
10 CAPSULE ORAL AT BEDTIME
Qty: 180 CAPSULE | Refills: 3 | OUTPATIENT
Start: 2022-10-14

## 2022-10-18 RX ORDER — PRAZOSIN HYDROCHLORIDE 2 MG/1
2-4 CAPSULE ORAL AT BEDTIME
Qty: 90 CAPSULE | Refills: 1 | Status: SHIPPED | OUTPATIENT
Start: 2022-10-18 | End: 2023-01-19

## 2022-10-18 NOTE — TELEPHONE ENCOUNTER
It appears, patient is requesting PCP to change Prazosin rx to 4mg every night.     Current RX is:    prazosin (MINIPRESS) 2 MG capsule 90 capsule 0 8/29/2022  No   Sig: TAKE 1 CAPSULE BY MOUTH AT BEDTIME     Routing message to PCP to review and advise.    Hugo De Santiago, BSN, RN   [facility]

## 2022-10-21 DIAGNOSIS — F43.10 PTSD (POST-TRAUMATIC STRESS DISORDER): ICD-10-CM

## 2022-10-22 ENCOUNTER — HEALTH MAINTENANCE LETTER (OUTPATIENT)
Age: 47
End: 2022-10-22

## 2022-10-24 NOTE — TELEPHONE ENCOUNTER
"Routing refill request to provider for review/approval because:  Early refill requested.    Last Written Prescription Date:  6/22/22  Last Fill Quantity: 270,  # refills: 1   Last office visit provider:  6/1/22     Requested Prescriptions   Pending Prescriptions Disp Refills     FLUoxetine (PROZAC) 20 MG capsule [Pharmacy Med Name: FLUOXETINE HCL 20 MG CAPSULE] 270 capsule 1     Sig: TAKE 3 CAPSULES BY MOUTH EVERY DAY       SSRIs Protocol Passed - 10/24/2022  1:53 PM        Passed - Recent (12 mo) or future (30 days) visit within the authorizing provider's specialty     Patient has had an office visit with the authorizing provider or a provider within the authorizing providers department within the previous 12 mos or has a future within next 30 days. See \"Patient Info\" tab in inbasket, or \"Choose Columns\" in Meds & Orders section of the refill encounter.              Passed - Medication is active on med list        Passed - Patient is age 18 or older             Tejinder Isaac RN 10/24/22 1:54 PM  "

## 2022-10-28 DIAGNOSIS — F51.5 NIGHTMARE: ICD-10-CM

## 2022-10-28 DIAGNOSIS — F43.10 PTSD (POST-TRAUMATIC STRESS DISORDER): ICD-10-CM

## 2022-10-29 RX ORDER — PRAZOSIN HYDROCHLORIDE 2 MG/1
2-4 CAPSULE ORAL AT BEDTIME
Qty: 90 CAPSULE | Refills: 1 | OUTPATIENT
Start: 2022-10-29

## 2022-10-30 NOTE — TELEPHONE ENCOUNTER
"Routing refill request to provider for review/approval because:  Drug not active on patient's medication list    Last Written Prescription Date:    Last Fill Quantity: ,  # refills:    Last office visit provider:  6/1/22     Requested Prescriptions   Pending Prescriptions Disp Refills     metFORMIN (GLUCOPHAGE) 1000 MG tablet       Sig: Take 1 tablet (1,000 mg) by mouth 2 times daily (with meals)       Biguanide Agents Failed - 10/28/2022 10:31 AM        Failed - Patient has documented A1c within the specified period of time.     If HgbA1C is 8 or greater, it needs to be on file within the past 3 months.  If less than 8, must be on file within the past 6 months.     Recent Labs   Lab Test 06/29/15  0954   A1C 5.2             Failed - Medication is active on med list        Passed - Patient is age 10 or older        Passed - Patient's CR is NOT>1.4 OR Patient's EGFR is NOT<45 within past 12 mos.     Recent Labs   Lab Test 06/01/22  0953 10/26/20  0917   GFRESTIMATED >90 >60   GFRESTBLACK  --  >60       Recent Labs   Lab Test 06/01/22  0953   CR 0.86             Passed - Patient does NOT have a diagnosis of CHF.        Passed - Recent (6 mo) or future (30 days) visit within the authorizing provider's specialty     Patient had office visit in the last 6 months or has a visit in the next 30 days with authorizing provider or within the authorizing provider's specialty.  See \"Patient Info\" tab in inbasket, or \"Choose Columns\" in Meds & Orders section of the refill encounter.                 Suzanne Starr RN 10/29/22 8:07 PM  "

## 2022-11-02 NOTE — TELEPHONE ENCOUNTER
Called and spoke to pt. He states he did not request this medication or has never taking it before

## 2023-01-04 DIAGNOSIS — F41.0 PANIC ATTACK: ICD-10-CM

## 2023-01-05 RX ORDER — GABAPENTIN 600 MG/1
900 TABLET ORAL 3 TIMES DAILY
Qty: 405 TABLET | Refills: 1 | Status: SHIPPED | OUTPATIENT
Start: 2023-01-05 | End: 2023-09-05

## 2023-01-05 NOTE — TELEPHONE ENCOUNTER
Routing refill request to provider for review/approval because:  Drug not on the [facility] refill protocol     Last Written Prescription Date:  6/1/22  Last Fill Quantity: 135,  # refills: 4   Last office visit provider:  6/1/22     Requested Prescriptions   Pending Prescriptions Disp Refills     gabapentin (NEURONTIN) 600 MG tablet [Pharmacy Med Name: GABAPENTIN 600 MG TABLET] 135 tablet 4     Sig: TAKE 1.5 TABLETS (900 MG) BY MOUTH 3 TIMES DAILY       There is no refill protocol information for this order          Tejinder Isaac RN 01/05/23 10:00 AM

## 2023-01-18 DIAGNOSIS — F43.10 PTSD (POST-TRAUMATIC STRESS DISORDER): ICD-10-CM

## 2023-01-18 DIAGNOSIS — F51.5 NIGHTMARE: ICD-10-CM

## 2023-01-19 RX ORDER — PRAZOSIN HYDROCHLORIDE 2 MG/1
2-4 CAPSULE ORAL AT BEDTIME
Qty: 90 CAPSULE | Refills: 1 | Status: SHIPPED | OUTPATIENT
Start: 2023-01-19 | End: 2023-04-28

## 2023-01-19 NOTE — TELEPHONE ENCOUNTER
"Routing refill request to provider for review/approval because:  bp not current    Last Written Prescription Date:  10/18/22  Last Fill Quantity: 90,  # refills: 1   Last office visit provider:  6/1/22     Requested Prescriptions   Pending Prescriptions Disp Refills     prazosin (MINIPRESS) 2 MG capsule 90 capsule 1     Sig: Take 1-2 capsules (2-4 mg) by mouth At Bedtime       Antiadrenergic Antihypertensives Failed - 1/18/2023  8:10 AM        Failed - Blood pressure less than 140/90 in past 6 months     BP Readings from Last 3 Encounters:   06/01/22 110/71   07/21/21 100/72   10/26/20 104/66                 Failed - Recent (6 mo) or future (30 days) visit within the authorizing provider's specialty     Patient had office visit in the last 6 months or has a visit in the next 30 days with authorizing provider or within the authorizing provider's specialty.  See \"Patient Info\" tab in inbasket, or \"Choose Columns\" in Meds & Orders section of the refill encounter.            Passed - Medication is active on med list        Passed - Patient is age 18 or older        Passed - Normal serum creatinine on file in past 12 months     Recent Labs   Lab Test 06/01/22  0953   CR 0.86       Ok to refill medication if creatinine is low         Alpha Blockers Passed - 1/18/2023  8:10 AM        Passed - Blood pressure under 140/90 in past 12 months     BP Readings from Last 3 Encounters:   06/01/22 110/71   07/21/21 100/72   10/26/20 104/66                 Passed - Recent (12 mo) or future (30 days) visit within the authorizing provider's specialty     Patient has had an office visit with the authorizing provider or a provider within the authorizing providers department within the previous 12 mos or has a future within next 30 days. See \"Patient Info\" tab in inbasket, or \"Choose Columns\" in Meds & Orders section of the refill encounter.              Passed - Patient does not have Tadalafil, Vardenafil, or Sildenafil on their " medication list        Passed - Medication is active on med list        Passed - Patient is 18 years of age or older             Suzanne Starr, RN 01/19/23 8:49 AM

## 2023-02-27 NOTE — PROGRESS NOTES
Anson returns for bilateral euflexxa injections #3  Doing well  Diagnosis: bilateral knee arthritis    PROCEDURE:     bilateral       Knee joint injection   The patient was apprised of the risks and the benefits of the procedure and consented and a written consent was signed.   The patient s  KNEEs were sterilely prepped with chloraprep.   The patient was injected with euflexxa syringe into the right and left knee with a 1.5-inch 22-gauge needle at the_superiorlateral aspect of their knee joint  There were no complications. The patient tolerated the procedure well. There was minimal bleeding.   The patient was instructed to ice the knees upon leaving clinic and refrain from overuse over the next 3 days.   The patient was instructed to go to the emergency room with any usual pain, swelling, or redness occurred in the injected area.   The patient was given a followup appointment to evaluate response to the injection to their increased range of motion and reduction of pain.  Follow up in 6 months  Dr Leo Bright   Patient still has cough. Mom is asking for refill promethazine-dextromethorphan (PROMETHAZINE-DM) 6.25-15 MG/5ML syrup. States this medication does not upset his stomach    Mom also wondering when he should have pulmonary testing done?

## 2023-04-27 DIAGNOSIS — F51.5 NIGHTMARE: ICD-10-CM

## 2023-04-27 DIAGNOSIS — F43.10 PTSD (POST-TRAUMATIC STRESS DISORDER): ICD-10-CM

## 2023-04-28 RX ORDER — PRAZOSIN HYDROCHLORIDE 2 MG/1
2-4 CAPSULE ORAL AT BEDTIME
Qty: 90 CAPSULE | Refills: 1 | Status: SHIPPED | OUTPATIENT
Start: 2023-04-28 | End: 2023-09-05

## 2023-04-28 NOTE — TELEPHONE ENCOUNTER
"Routing refill request to provider for review/approval because:  Labs not current: No BP documented in past 6 months    Last Written Prescription Date:  01/19/2023  Last Fill Quantity: 90,  # refills: 1   Last office visit provider:  06/01/2022     Requested Prescriptions   Pending Prescriptions Disp Refills     prazosin (MINIPRESS) 2 MG capsule 90 capsule 1     Sig: Take 1-2 capsules (2-4 mg) by mouth At Bedtime       Antiadrenergic Antihypertensives Failed - 4/28/2023 12:36 PM        Failed - Blood pressure less than 140/90 in past 6 months     BP Readings from Last 3 Encounters:   06/01/22 110/71   07/21/21 100/72   10/26/20 104/66                 Passed - Medication is active on med list        Passed - Patient is age 18 or older        Passed - Normal serum creatinine on file in past 12 months     Recent Labs   Lab Test 06/01/22  0953   CR 0.86       Ok to refill medication if creatinine is low          Passed - Recent (6 mo) or future (30 days) visit within the authorizing provider's specialty     Patient had office visit in the last 6 months or has a visit in the next 30 days with authorizing provider or within the authorizing provider's specialty.  See \"Patient Info\" tab in inbasket, or \"Choose Columns\" in Meds & Orders section of the refill encounter.           Alpha Blockers Passed - 4/28/2023 12:36 PM        Passed - Blood pressure under 140/90 in past 12 months     BP Readings from Last 3 Encounters:   06/01/22 110/71   07/21/21 100/72   10/26/20 104/66                 Passed - Recent (12 mo) or future (30 days) visit within the authorizing provider's specialty     Patient has had an office visit with the authorizing provider or a provider within the authorizing providers department within the previous 12 mos or has a future within next 30 days. See \"Patient Info\" tab in inbasket, or \"Choose Columns\" in Meds & Orders section of the refill encounter.              Passed - Patient does not have Tadalafil, " Vardenafil, or Sildenafil on their medication list        Passed - Medication is active on med list        Passed - Patient is 18 years of age or older             Sheri Saldana RN 04/28/23 12:37 PM

## 2023-05-02 ENCOUNTER — PATIENT OUTREACH (OUTPATIENT)
Dept: CARE COORDINATION | Facility: CLINIC | Age: 48
End: 2023-05-02
Payer: COMMERCIAL

## 2023-05-04 DIAGNOSIS — G47.00 INSOMNIA: ICD-10-CM

## 2023-05-05 NOTE — TELEPHONE ENCOUNTER
"Routing refill request to provider for review/approval because:  Early refill request    Last Written Prescription Date:  6/8/2022  Last Fill Quantity: 180,  # refills: 1   Last office visit provider:  6/1/2022     Requested Prescriptions   Pending Prescriptions Disp Refills     traZODone (DESYREL) 150 MG tablet [Pharmacy Med Name: TRAZODONE 150 MG TABLET] 90 tablet 3     Sig: TAKE 1 TABLET BY MOUTH AT BEDTIME       Serotonin Modulators Passed - 5/5/2023 12:59 PM        Passed - Recent (12 mo) or future (30 days) visit within the authorizing provider's specialty     Patient has had an office visit with the authorizing provider or a provider within the authorizing providers department within the previous 12 mos or has a future within next 30 days. See \"Patient Info\" tab in inbasket, or \"Choose Columns\" in Meds & Orders section of the refill encounter.              Passed - Medication is active on med list        Passed - Patient is age 18 or older             Lanny Chin RN 05/05/23 1:00 PM  "

## 2023-05-08 RX ORDER — TRAZODONE HYDROCHLORIDE 150 MG/1
TABLET ORAL
Qty: 90 TABLET | Refills: 3 | Status: SHIPPED | OUTPATIENT
Start: 2023-05-08

## 2023-05-15 ENCOUNTER — TELEPHONE (OUTPATIENT)
Dept: FAMILY MEDICINE | Facility: CLINIC | Age: 48
End: 2023-05-15

## 2023-05-15 DIAGNOSIS — N52.02 CORPORO-VENOUS OCCLUSIVE ERECTILE DYSFUNCTION: Primary | ICD-10-CM

## 2023-05-15 NOTE — TELEPHONE ENCOUNTER
Central Prior Authorization Team   Phone: [phone]      PRIOR AUTHORIZATION DENIED    Medication: sildenafil (REVATIO) 20 MG tablet - EPA DENIED    Denial Date: 5/15/2023    Denial Rational: COVERAGE REQUIRES A DX OF PULMONARY ARTERIAL HYPERTENSION      Appeal Information: IF PROVIDER WOULD LIKE TO APPEAL THIS DECISION PLEASE PROVIDE PA TEAM WITH LETTER OF MEDICAL NECESSITY

## 2023-05-15 NOTE — TELEPHONE ENCOUNTER
Please let the patient know that insurance did not cover the Revatio/sildenafil, he can pay out-of-pocket at the pharmacy.

## 2023-05-16 ENCOUNTER — PATIENT OUTREACH (OUTPATIENT)
Dept: CARE COORDINATION | Facility: CLINIC | Age: 48
End: 2023-05-16
Payer: COMMERCIAL

## 2023-05-16 NOTE — TELEPHONE ENCOUNTER
Called and spoke to patient relay to message below. Patient stated that are they other version of medications that similar to the medication below. Stated he had emailed his new insurance to your e-mail.     Have you received his e-mail?

## 2023-05-17 ENCOUNTER — TELEPHONE (OUTPATIENT)
Dept: FAMILY MEDICINE | Facility: CLINIC | Age: 48
End: 2023-05-17
Payer: COMMERCIAL

## 2023-05-17 NOTE — TELEPHONE ENCOUNTER
Not sure why they have my address on [facility]. but I' ve  always been at the [facility] , please call the insurance and update my address.  Thank you

## 2023-05-17 NOTE — TELEPHONE ENCOUNTER
General Call    Contacts       Type Contact Phone/Fax    05/17/2023 11:34 AM CDT Phone (Incoming) LULÚ AT [facility] [phone]        Reason for Call:  Lulú from [facility] calling to say PCP needs to update his address info as pt insurance unable to pay for services rendered due to change of address for provider.     What are your questions or concerns:  [facility] is listed address for PCP as [address] [place] [address] in [place] when it needs to be [address] [place] [address].  Please call member services at [phone] to update that address.     Date of last appointment with provider: 5/15/23    Call taken on 5/17/23 at 1138 am by LUCIAN Gray

## 2023-05-17 NOTE — TELEPHONE ENCOUNTER
That is what I told the caller, however she was adament that the provider needed to change his address and not staff by phoning provider services.   I am sorry.  Thanks    LUCIAN Ohara TC

## 2023-05-18 RX ORDER — SILDENAFIL CITRATE 20 MG/1
TABLET ORAL
Qty: 30 TABLET | Refills: 3 | Status: SHIPPED | OUTPATIENT
Start: 2023-05-18

## 2023-05-19 NOTE — TELEPHONE ENCOUNTER
This what PCP needs to do  And only PCP per insurance:     Please call member services at [phone] to update that address.       Thanks,

## 2023-06-22 DIAGNOSIS — G47.00 INSOMNIA: ICD-10-CM

## 2023-06-22 RX ORDER — TRAZODONE HYDROCHLORIDE 150 MG/1
150 TABLET ORAL AT BEDTIME
Qty: 90 TABLET | Refills: 3 | OUTPATIENT
Start: 2023-06-22

## 2023-06-22 NOTE — TELEPHONE ENCOUNTER
Refused; Last Written Prescription Date: 5/8/2023    Last Fill Quantity: 90,  # refills: 3     Amber Danielson RN 06/22/23 10:58 AM

## 2023-07-13 ENCOUNTER — OFFICE VISIT (OUTPATIENT)
Dept: FAMILY MEDICINE | Facility: CLINIC | Age: 48
End: 2023-07-13
Payer: COMMERCIAL

## 2023-07-13 VITALS
HEIGHT: 75 IN | RESPIRATION RATE: 18 BRPM | DIASTOLIC BLOOD PRESSURE: 61 MMHG | WEIGHT: 184 LBS | BODY MASS INDEX: 22.88 KG/M2 | OXYGEN SATURATION: 97 % | TEMPERATURE: 98.1 F | HEART RATE: 60 BPM | SYSTOLIC BLOOD PRESSURE: 98 MMHG

## 2023-07-13 DIAGNOSIS — Z13.0 SCREENING FOR ENDOCRINE, NUTRITIONAL, METABOLIC AND IMMUNITY DISORDER: ICD-10-CM

## 2023-07-13 DIAGNOSIS — Z00.00 ROUTINE GENERAL MEDICAL EXAMINATION AT A HEALTH CARE FACILITY: Primary | ICD-10-CM

## 2023-07-13 DIAGNOSIS — Z12.11 SCREEN FOR COLON CANCER: ICD-10-CM

## 2023-07-13 DIAGNOSIS — Z13.29 SCREENING FOR ENDOCRINE, NUTRITIONAL, METABOLIC AND IMMUNITY DISORDER: ICD-10-CM

## 2023-07-13 DIAGNOSIS — Z13.228 SCREENING FOR ENDOCRINE, NUTRITIONAL, METABOLIC AND IMMUNITY DISORDER: ICD-10-CM

## 2023-07-13 DIAGNOSIS — Z13.21 SCREENING FOR ENDOCRINE, NUTRITIONAL, METABOLIC AND IMMUNITY DISORDER: ICD-10-CM

## 2023-07-13 LAB
ALBUMIN SERPL BCG-MCNC: 4.3 G/DL (ref 3.5–5.2)
ALP SERPL-CCNC: 99 U/L (ref 40–129)
ALT SERPL W P-5'-P-CCNC: 48 U/L (ref 0–70)
ANION GAP SERPL CALCULATED.3IONS-SCNC: 8 MMOL/L (ref 7–15)
AST SERPL W P-5'-P-CCNC: 31 U/L (ref 0–45)
BILIRUB SERPL-MCNC: 0.4 MG/DL
BUN SERPL-MCNC: 17.3 MG/DL (ref 6–20)
CALCIUM SERPL-MCNC: 9.5 MG/DL (ref 8.6–10)
CHLORIDE SERPL-SCNC: 104 MMOL/L (ref 98–107)
CHOLEST SERPL-MCNC: 150 MG/DL
CREAT SERPL-MCNC: 1.07 MG/DL (ref 0.67–1.17)
DEPRECATED HCO3 PLAS-SCNC: 27 MMOL/L (ref 22–29)
GFR SERPL CREATININE-BSD FRML MDRD: 87 ML/MIN/1.73M2
GLUCOSE SERPL-MCNC: 93 MG/DL (ref 70–99)
HBA1C MFR BLD: 5.5 % (ref 0–5.6)
HDLC SERPL-MCNC: 64 MG/DL
LDLC SERPL CALC-MCNC: 77 MG/DL
NONHDLC SERPL-MCNC: 86 MG/DL
POTASSIUM SERPL-SCNC: 4.4 MMOL/L (ref 3.4–5.3)
PROT SERPL-MCNC: 6.5 G/DL (ref 6.4–8.3)
SODIUM SERPL-SCNC: 139 MMOL/L (ref 136–145)
TRIGL SERPL-MCNC: 43 MG/DL
TSH SERPL DL<=0.005 MIU/L-ACNC: 2.78 UIU/ML (ref 0.3–4.2)

## 2023-07-13 PROCEDURE — 99396 PREV VISIT EST AGE 40-64: CPT | Mod: 25 | Performed by: FAMILY MEDICINE

## 2023-07-13 PROCEDURE — 83036 HEMOGLOBIN GLYCOSYLATED A1C: CPT | Performed by: FAMILY MEDICINE

## 2023-07-13 PROCEDURE — 36415 COLL VENOUS BLD VENIPUNCTURE: CPT | Performed by: FAMILY MEDICINE

## 2023-07-13 PROCEDURE — 80061 LIPID PANEL: CPT | Performed by: FAMILY MEDICINE

## 2023-07-13 PROCEDURE — 90471 IMMUNIZATION ADMIN: CPT | Performed by: FAMILY MEDICINE

## 2023-07-13 PROCEDURE — 90746 HEPB VACCINE 3 DOSE ADULT IM: CPT | Performed by: FAMILY MEDICINE

## 2023-07-13 PROCEDURE — 90632 HEPA VACCINE ADULT IM: CPT | Performed by: FAMILY MEDICINE

## 2023-07-13 PROCEDURE — 80053 COMPREHEN METABOLIC PANEL: CPT | Performed by: FAMILY MEDICINE

## 2023-07-13 PROCEDURE — 84443 ASSAY THYROID STIM HORMONE: CPT | Performed by: FAMILY MEDICINE

## 2023-07-13 PROCEDURE — 90472 IMMUNIZATION ADMIN EACH ADD: CPT | Performed by: FAMILY MEDICINE

## 2023-07-13 ASSESSMENT — ENCOUNTER SYMPTOMS
ABDOMINAL PAIN: 0
HEMATOCHEZIA: 0
DIZZINESS: 0
MYALGIAS: 1
HEARTBURN: 1
NAUSEA: 0
SHORTNESS OF BREATH: 0
DYSURIA: 0
NERVOUS/ANXIOUS: 0
JOINT SWELLING: 0
COUGH: 0
CHILLS: 0
ARTHRALGIAS: 1
PALPITATIONS: 0
DIARRHEA: 0
FEVER: 0
HEMATURIA: 0
PARESTHESIAS: 0
FREQUENCY: 1
WEAKNESS: 0
SORE THROAT: 0
CONSTIPATION: 0
EYE PAIN: 0
HEADACHES: 1
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
## 2023-07-18 DIAGNOSIS — N52.9 ERECTILE DYSFUNCTION, UNSPECIFIED ERECTILE DYSFUNCTION TYPE: ICD-10-CM

## 2023-07-18 RX ORDER — SILDENAFIL CITRATE 20 MG/1
TABLET ORAL
Qty: 30 TABLET | Refills: 3 | Status: SHIPPED | OUTPATIENT
Start: 2023-07-18

## 2023-07-18 NOTE — TELEPHONE ENCOUNTER
"Routing refill request to provider for review/approval because:  Alpha Blocker on Med list     Last Written Prescription Date:  5/15/2023  Last Fill Quantity: 30,  # refills: 3   Last office visit provider:  7/13/2023 with PCP Dr EVERETTE Dutta      Requested Prescriptions   Pending Prescriptions Disp Refills    sildenafil (REVATIO) 20 MG tablet 30 tablet 3     Sig: Take 2-3 tabs PO 1 hr before sex       Erectile Dysfuction Protocol Failed - 7/18/2023  7:51 AM        Failed - Absence of Alpha Blockers on Med list        Passed - Absence of nitrates on medication list        Passed - Recent (12 mo) or future (30 days) visit within the authorizing provider's specialty     Patient has had an office visit with the authorizing provider or a provider within the authorizing providers department within the previous 12 mos or has a future within next 30 days. See \"Patient Info\" tab in inbasket, or \"Choose Columns\" in Meds & Orders section of the refill encounter.              Passed - Medication is active on med list        Passed - Patient is age 18 or older             Sheron Mckeon RN 07/18/23 3:21 PM  "

## 2023-09-05 DIAGNOSIS — G47.00 INSOMNIA: ICD-10-CM

## 2023-09-05 DIAGNOSIS — F43.10 PTSD (POST-TRAUMATIC STRESS DISORDER): ICD-10-CM

## 2023-09-05 DIAGNOSIS — F41.0 PANIC ATTACK: ICD-10-CM

## 2023-09-05 DIAGNOSIS — F51.5 NIGHTMARE: ICD-10-CM

## 2023-09-05 RX ORDER — PRAZOSIN HYDROCHLORIDE 2 MG/1
2-4 CAPSULE ORAL AT BEDTIME
Qty: 90 CAPSULE | Refills: 1 | Status: SHIPPED | OUTPATIENT
Start: 2023-09-05

## 2023-09-05 RX ORDER — TRAZODONE HYDROCHLORIDE 150 MG/1
150 TABLET ORAL AT BEDTIME
Qty: 90 TABLET | Refills: 2 | OUTPATIENT
Start: 2023-09-05

## 2023-09-05 RX ORDER — GABAPENTIN 600 MG/1
900 TABLET ORAL 3 TIMES DAILY
Qty: 405 TABLET | Refills: 1 | Status: SHIPPED | OUTPATIENT
Start: 2023-09-05

## 2023-10-02 NOTE — TELEPHONE ENCOUNTER
Pt calling requesting for referral in order for his insurance to cover his visits with Dr. Dutta.     [phone] member services for [employer] insurance.     Pt is upset, this should have been done back in May when it was requested x2 already. Please respond to patient and call him back.

## 2023-10-06 DIAGNOSIS — F43.10 PTSD (POST-TRAUMATIC STRESS DISORDER): ICD-10-CM

## 2023-10-11 ENCOUNTER — MEDICAL CORRESPONDENCE (OUTPATIENT)
Dept: HEALTH INFORMATION MANAGEMENT | Facility: CLINIC | Age: 48
End: 2023-10-11

## 2023-12-01 DIAGNOSIS — F43.10 PTSD (POST-TRAUMATIC STRESS DISORDER): ICD-10-CM

## 2024-07-01 DIAGNOSIS — N52.9 ERECTILE DYSFUNCTION, UNSPECIFIED ERECTILE DYSFUNCTION TYPE: Primary | ICD-10-CM

## 2024-07-03 RX ORDER — SILDENAFIL CITRATE 20 MG/1
TABLET ORAL
Qty: 30 TABLET | Refills: 3 | Status: SHIPPED | OUTPATIENT
Start: 2024-07-03

## 2024-10-19 ENCOUNTER — HEALTH MAINTENANCE LETTER (OUTPATIENT)
Age: 49
End: 2024-10-19

## (undated) RX ORDER — HYALURONATE SODIUM 10 MG/ML
SYRINGE (ML) INTRAARTICULAR
Status: DISPENSED
Start: 2022-02-11

## (undated) RX ORDER — LIDOCAINE HYDROCHLORIDE 10 MG/ML
INJECTION, SOLUTION INFILTRATION; PERINEURAL
Status: DISPENSED
Start: 2022-02-01

## (undated) RX ORDER — LIDOCAINE HYDROCHLORIDE 10 MG/ML
INJECTION, SOLUTION INFILTRATION; PERINEURAL
Status: DISPENSED
Start: 2022-01-04

## (undated) RX ORDER — HYALURONATE SODIUM 10 MG/ML
SYRINGE (ML) INTRAARTICULAR
Status: DISPENSED
Start: 2022-02-01

## (undated) RX ORDER — LIDOCAINE HYDROCHLORIDE 10 MG/ML
INJECTION, SOLUTION INFILTRATION; PERINEURAL
Status: DISPENSED
Start: 2022-02-11

## (undated) RX ORDER — HYALURONATE SODIUM 10 MG/ML
SYRINGE (ML) INTRAARTICULAR
Status: DISPENSED
Start: 2022-01-04